# Patient Record
Sex: FEMALE | Race: WHITE | NOT HISPANIC OR LATINO | Employment: FULL TIME | URBAN - METROPOLITAN AREA
[De-identification: names, ages, dates, MRNs, and addresses within clinical notes are randomized per-mention and may not be internally consistent; named-entity substitution may affect disease eponyms.]

---

## 2017-02-14 ENCOUNTER — TRANSFERRED RECORDS (OUTPATIENT)
Dept: HEALTH INFORMATION MANAGEMENT | Facility: CLINIC | Age: 27
End: 2017-02-14

## 2017-02-14 LAB — PAP-ABSTRACT: NORMAL

## 2018-12-14 ENCOUNTER — TELEPHONE (OUTPATIENT)
Dept: OTHER | Facility: CLINIC | Age: 28
End: 2018-12-14

## 2018-12-14 NOTE — TELEPHONE ENCOUNTER
12/14/2018    Call Regarding Onboarding: P1 - other    Attempt 1    Message on voicemail     Comments: no dep      Outreach   SV

## 2018-12-28 NOTE — TELEPHONE ENCOUNTER
12/28/2018    Call Regarding Onboarding P1 Other    Attempt 2    Message on voicemail     Comments:       Outreach   CWR

## 2019-01-09 NOTE — TELEPHONE ENCOUNTER
1/9/2019    Call Regarding Onboarding P1 Other    Attempt 3    Message on voicemail     Comments: 0 DEP      Outreach   CC

## 2019-01-17 ENCOUNTER — TELEPHONE (OUTPATIENT)
Dept: PEDIATRICS | Facility: CLINIC | Age: 29
End: 2019-01-17

## 2019-01-17 NOTE — TELEPHONE ENCOUNTER
"Patient has felt lightheaded since yesterday.  Notes this when she stands up for extended period of time  No shortness of breath, or chest pain.   Has not noted any irregularity in heart beat, no increase in heartbeat that she is aware of.    Yesterday am was at work for one hour and noted she felt lightheaded, toes and fingers felt cold.  Had to sit down quickly due to feeling lightheaded.  Has not passed out.  Was told that she looked pale at the time.  Ate some peanuts and drank some orange juice and felt a little better, but \"still off.\"  Went home and took a nap and then felt better.  Today feels tired, even though she slept all night.  Has menses now.   Doesn't feel that the flow is heavy.    Has noted feeling lightheaded in the past with her menses.    Not currently ill,  Denies sinus congestion or cold symptoms.  No fever.  Today has been resting at home and has not noted feeling lightheaded today.  Appointment has been scheduled for tomorrow  Advised patient if she does experience heart irregularity, or an increase in heart rate with the lightheadedness, she should proceed to the ER for evaluation.  Patient is in agreement.  No further questions.  Will call back if any other questions or concerns.  BRIAN Thakkar RN      "

## 2019-01-18 ENCOUNTER — OFFICE VISIT (OUTPATIENT)
Dept: PEDIATRICS | Facility: CLINIC | Age: 29
End: 2019-01-18
Payer: COMMERCIAL

## 2019-01-18 VITALS
DIASTOLIC BLOOD PRESSURE: 58 MMHG | HEIGHT: 65 IN | OXYGEN SATURATION: 99 % | BODY MASS INDEX: 22.98 KG/M2 | WEIGHT: 137.9 LBS | SYSTOLIC BLOOD PRESSURE: 102 MMHG | TEMPERATURE: 97.6 F

## 2019-01-18 DIAGNOSIS — R42 LIGHTHEADEDNESS: Primary | ICD-10-CM

## 2019-01-18 LAB
BASOPHILS # BLD AUTO: 0 10E9/L (ref 0–0.2)
BASOPHILS NFR BLD AUTO: 0.5 %
DIFFERENTIAL METHOD BLD: NORMAL
EOSINOPHIL # BLD AUTO: 0.1 10E9/L (ref 0–0.7)
EOSINOPHIL NFR BLD AUTO: 1.1 %
ERYTHROCYTE [DISTWIDTH] IN BLOOD BY AUTOMATED COUNT: 11.5 % (ref 10–15)
HCT VFR BLD AUTO: 43.3 % (ref 35–47)
HGB BLD-MCNC: 14.8 G/DL (ref 11.7–15.7)
LYMPHOCYTES # BLD AUTO: 2.1 10E9/L (ref 0.8–5.3)
LYMPHOCYTES NFR BLD AUTO: 26.6 %
MCH RBC QN AUTO: 31 PG (ref 26.5–33)
MCHC RBC AUTO-ENTMCNC: 34.2 G/DL (ref 31.5–36.5)
MCV RBC AUTO: 91 FL (ref 78–100)
MONOCYTES # BLD AUTO: 0.5 10E9/L (ref 0–1.3)
MONOCYTES NFR BLD AUTO: 6.9 %
NEUTROPHILS # BLD AUTO: 5.1 10E9/L (ref 1.6–8.3)
NEUTROPHILS NFR BLD AUTO: 64.9 %
PLATELET # BLD AUTO: 215 10E9/L (ref 150–450)
RBC # BLD AUTO: 4.78 10E12/L (ref 3.8–5.2)
WBC # BLD AUTO: 7.9 10E9/L (ref 4–11)

## 2019-01-18 PROCEDURE — 84443 ASSAY THYROID STIM HORMONE: CPT | Performed by: NURSE PRACTITIONER

## 2019-01-18 PROCEDURE — 36415 COLL VENOUS BLD VENIPUNCTURE: CPT | Performed by: NURSE PRACTITIONER

## 2019-01-18 PROCEDURE — 85025 COMPLETE CBC W/AUTO DIFF WBC: CPT | Performed by: NURSE PRACTITIONER

## 2019-01-18 PROCEDURE — 99214 OFFICE O/P EST MOD 30 MIN: CPT | Performed by: NURSE PRACTITIONER

## 2019-01-18 PROCEDURE — 80048 BASIC METABOLIC PNL TOTAL CA: CPT | Performed by: NURSE PRACTITIONER

## 2019-01-18 ASSESSMENT — MIFFLIN-ST. JEOR: SCORE: 1370.35

## 2019-01-18 NOTE — PROGRESS NOTES
"  SUBJECTIVE:   Jeny Beasley is a 28 year old female who presents to clinic today for the following health issues:    RN note from 11/17  Patient has felt lightheaded since yesterday.  Notes this when she stands up for extended period of time  No shortness of breath, or chest pain.   Has not noted any irregularity in heart beat, no increase in heartbeat that she is aware of.     Yesterday am was at work for one hour and noted she felt lightheaded, toes and fingers felt cold.  Had to sit down quickly due to feeling lightheaded.  Has not passed out.  Was told that she looked pale at the time.  Ate some peanuts and drank some orange juice and felt a little better, but \"still off.\"  Went home and took a nap and then felt better.  Today feels tired, even though she slept all night.  Has menses now.   Doesn't feel that the flow is heavy.    Has noted feeling lightheaded in the past with her menses.     Not currently ill,   Denies sinus congestion or cold symptoms.  No fever.  Today has been resting at home and has not noted feeling lightheaded today.  Appointment has been scheduled for tomorrow  Advised patient if she does experience heart irregularity, or an increase in heart rate with the lightheadedness, she should proceed to the ER for evaluation.  Patient is in agreement.  No further questions.  Will call back if any other questions or concerns.    Dizziness  Fatigue and lightheadedness - has happened before with period, worse this time - is on period now      Duration: since 1/16/19, intermittent    Description   Feeling faint:  YES  Feeling like the surroundings are moving: no   Loss of consciousness or falls: no     Intensity:  Moderate, intermittent    Accompanying signs and symptoms:   Nausea/vomitting: YES- nausea  Palpitations: no   Weakness in arms or legs: no   Vision or speech changes: no   Ringing in ears (Tinnitus): no   Hearing loss related to dizziness: no   Other (fevers/chills/sweating/dyspnea): YES- " "fingers and toes get cold    History (similar episodes/head trauma/previous evaluation/recent bleeding): was told she turned white in the face    Precipitating or alleviating factors (new meds/chemicals): None  Worse with activity/head movement: no     Therapies tried and outcome: None    States had last pap at [facility] 2/14/17 - NIL.      States she typically gets somewhat lightheaded with her menses for a few days. Menses are regular, not particularly heavy. These symptoms are similar but more severe, although this period has been the same a usual. She typically feels dizzy with standing for several minutes, but not immediately with standing. No increase in anxiety. Room not spinning. No ha, vision changes, hearing changes, confusion, palpitations, shortness of breath, etc.     ROS: const/neuro/cv/resp/heent/neuro otherwise negative     OBJECTIVE:  /58 (BP Location: Right arm, Cuff Size: Adult Regular)   Temp 97.6  F (36.4  C) (Tympanic)   Ht 1.657 m (5' 5.25\")   Wt 62.6 kg (137 lb 14.4 oz)   LMP 01/15/2019 (Approximate)   SpO2 99%   BMI 22.77 kg/m    CONSTITUTIONAL: Alert, well-nourished, well-groomed, NAD  RESP: Lungs CTA. No wheeze, rhonchi, rales.  CV: HRRR S1 S2 No MRG. No peripheral edema  NEURO: CN II-XII grossly intact. No nystagmus. Speech and mentation appropriate. Normal gait. Romberg negative.   PSYCH: Bright affect. Appropriate mentation and speech.       ASSESSMENT/PLAN:  (R42) Lightheadedness  (primary encounter diagnosis)  Comment: As above. Non specific lightheadedness with her menses, much worse this month over the past few days. No red flag sx. DDX includes anemia, anxiety, dehydration, vasovagal, orthostasis, etc.   Plan: CBC with platelets differential, Basic         metabolic panel, TSH with free T4 reflex          -Increase fluids  -Check labs  -Discussed supportive cares and reasons to return. Discussed reasons to seek care urgently.   -Will determine plan based on labs. "       Nancy Lovett, CINDY-CHIDI.

## 2019-01-19 LAB
ANION GAP SERPL CALCULATED.3IONS-SCNC: 8 MMOL/L (ref 3–14)
BUN SERPL-MCNC: 18 MG/DL (ref 7–30)
CALCIUM SERPL-MCNC: 9.1 MG/DL (ref 8.5–10.1)
CHLORIDE SERPL-SCNC: 105 MMOL/L (ref 94–109)
CO2 SERPL-SCNC: 26 MMOL/L (ref 20–32)
CREAT SERPL-MCNC: 0.79 MG/DL (ref 0.52–1.04)
GFR SERPL CREATININE-BSD FRML MDRD: >90 ML/MIN/{1.73_M2}
GLUCOSE SERPL-MCNC: 66 MG/DL (ref 70–99)
POTASSIUM SERPL-SCNC: 4.4 MMOL/L (ref 3.4–5.3)
SODIUM SERPL-SCNC: 139 MMOL/L (ref 133–144)
TSH SERPL DL<=0.005 MIU/L-ACNC: 1.22 MU/L (ref 0.4–4)

## 2019-01-21 ENCOUNTER — OFFICE VISIT (OUTPATIENT)
Dept: PEDIATRICS | Facility: CLINIC | Age: 29
End: 2019-01-21
Payer: COMMERCIAL

## 2019-01-21 VITALS
HEART RATE: 85 BPM | OXYGEN SATURATION: 100 % | WEIGHT: 139.4 LBS | SYSTOLIC BLOOD PRESSURE: 122 MMHG | BODY MASS INDEX: 23.22 KG/M2 | HEIGHT: 65 IN | TEMPERATURE: 97.7 F | DIASTOLIC BLOOD PRESSURE: 68 MMHG

## 2019-01-21 DIAGNOSIS — R42 LIGHTHEADEDNESS: Primary | ICD-10-CM

## 2019-01-21 PROCEDURE — 99214 OFFICE O/P EST MOD 30 MIN: CPT | Performed by: NURSE PRACTITIONER

## 2019-01-21 ASSESSMENT — MIFFLIN-ST. JEOR: SCORE: 1377.15

## 2019-01-21 NOTE — PATIENT INSTRUCTIONS
1. Please stay hydrated and well fed. Eat protein and fat not just carbs  2. Please schedule cardiology a month out.   3. Keep me posted with any symptom changes.

## 2019-01-21 NOTE — LETTER
[facility]  [address]  [address]  [place] [address]  [phone]          January 21, 2019    RE:  Jeny Beasley                                                                                                                                                       [address]   [place] [place] [address]            To whom it may concern:    Jeny Beasley is under my professional care. She was seen in clinic today and last week.     She missed part of 1/16 through today. We do not know how long her illness will last.     Sincerely,        Nancy Lovett RN, CNP

## 2019-01-21 NOTE — PROGRESS NOTES
"  SUBJECTIVE:   Jeny Beasley is a 28 year old adult who presents to clinic today for the following health issues:    Patient last seen 1/18/19 for lightheadedness, body weakness.  States symptoms have not improved. States standing for long periods is difficult - had to go home from work. Trouble focusing, states she felt like her body did not want to keep standing.    ROS: const/heent/neuro/cv/resp/psych otherwise negative     OBJECTIVE:  /68 (BP Location: Right arm, Cuff Size: Adult Regular)   Pulse 85   Temp 97.7  F (36.5  C) (Tympanic)   Ht 1.657 m (5' 5.25\")   Wt 63.2 kg (139 lb 6.4 oz)   LMP 01/15/2019 (Approximate)   SpO2 98%   BMI 23.02 kg/m    CONSTITUTIONAL: Alert, well-nourished, well-groomed, NAD  RESP: Lungs CTA. No wheeze, rhonchi, rales.  CV: HRRR S1 S2 No MRG. No peripheral edema  NEURO: CN II-XII grossly intact. No nystagmus. Speech and mentation appropriate. Normal gait. Romberg negative.     ASSESSMENT/PLAN:  (R42) Lightheadedness  (primary encounter diagnosis)  Comment: Again seen for very vague symptoms. She is unable to verbalize how she is feeling exactly. States she is not necessarily dizzy or lightheaded but also uses those words sometimes. Also feels somewhat weak but denies fatigue or muscle weakness. She states \"her body is just telling her to lie down.\" Denies headache, numbness/tingling, muscle weakness, vision changes, palpitations, shortness of breath, ankle swelling, orthopnea, etc. Her labs on previous visit were normal. Exam and vitals normal today. She orthostatic BP was normal but HR was up to 115 with standing for 5 minutes and regular. Possible pots vs viral illness. No red flags for urgent eval.   Plan: CARDIOLOGY EVAL ADULT REFERRAL          -Increase fluids, sleep, eat q 3 hours  -Schedule for cards  -Discussed supportive cares and reasons to return. Discussed reasons to seek care urgently.             Nancy Lovett, FNP-DNP.        "

## 2019-02-05 ENCOUNTER — TELEPHONE (OUTPATIENT)
Dept: PEDIATRICS | Facility: CLINIC | Age: 29
End: 2019-02-05

## 2019-02-05 NOTE — TELEPHONE ENCOUNTER
Panel Management Review      Patient has the following on Jeny Beasley's problem list: None      Composite cancer screening  Chart review shows that this patient is due/due soon for the following Pap Smear  Summary:    Patient is due/failing the following:   PAP and PHYSICAL    Action needed:   Patient needs office visit for pap and physical.    Type of outreach:    Per Care Everywhere patient had pap 2/14/17 at [facility] - NIL result.    Questions for provider review:    None                                                                                                                                  Tamica Corbin CMA    Chart closed .

## 2020-03-11 ENCOUNTER — HEALTH MAINTENANCE LETTER (OUTPATIENT)
Age: 30
End: 2020-03-11

## 2020-07-04 ENCOUNTER — VIRTUAL VISIT (OUTPATIENT)
Dept: FAMILY MEDICINE | Facility: OTHER | Age: 30
End: 2020-07-04

## 2020-07-04 NOTE — PROGRESS NOTES
"Date: 2020 11:35:17  Clinician: Tommy Mireles  Clinician NPI: [id number]  Patient: Jeny Beasley  Patient : 1990  Patient Address: [address] [place] [address] [place] [address]  Patient Phone: [phone]  Visit Protocol: URI  Patient Summary:  Jeny is a 29 year old ( : 1990 ) female who initiated a Visit for cold, sinus infection, or influenza. When asked the question \"Please sign me up to receive news, health information and promotions from [employer].\", Jeny responded \"No\".    Jeny states her symptoms started 1-2 days ago.   Her symptoms consist of a sore throat.   Symptom details   Sore throat: Jeny reports having mild throat pain (1-3 on a 10 point pain scale), does not have exudate on her tonsils, and can swallow liquids. She is not sure if the lymph nodes in her neck are enlarged. A rash has not appeared on the skin since the sore throat started.    Jeny denies having wheezing, nausea, teeth pain, ageusia, diarrhea, vomiting, rhinitis, malaise, ear pain, headache, chills, myalgias, anosmia, facial pain or pressure, fever, cough, and nasal congestion. She also denies having recent facial or sinus surgery in the past 60 days and taking antibiotic medication in the past month. She is not experiencing dyspnea.   Precipitating events  Jeny is not sure if she has been exposed to someone with strep throat.   Pertinent COVID-19 (Coronavirus) information  In the past 14 days, Jeny has not worked in a congregate living setting.   She does not work or volunteer as healthcare worker or a  and does not work or volunteer in a healthcare facility.   Jeny also has not lived in a congregate living setting in the past 14 days. She does not live with a healthcare worker.   Jeny has not had a close contact with a laboratory-confirmed COVID-19 patient within 14 days of symptom onset.   Pertinent medical history  Jeny does not get yeast infections when she takes antibiotics.   Jeny " needs a return to work/school note.   Weight: 130 lbs   Jeny does not smoke or use smokeless tobacco.   She denies pregnancy and denies breastfeeding. She has menstruated in the past month.   Weight: 130 lbs    MEDICATIONS: Sleep Tablet (diphenhydramine) oral, One Daily Women's oral, ALLERGIES: NKDA  Clinician Response:  Dear Jeny,   Your symptoms show that you may have coronavirus (COVID-19). This illness can cause fever, cough and trouble breathing. Many people get a mild case and get better on their own. Some people can get very sick.  What should I do?  We would like to test you for this virus.   1. Please call [phone] to schedule your visit. Explain that you were referred by [facility] to have a COVID-19 test. Be ready to share your [facility] visit ID number.  The following will serve as your written order for this COVID Test, ordered by me, for the indication of suspected COVID [Z20.828]: The test will be ordered in [employer], our electronic health record, after you are scheduled. It will show as ordered and authorized by Fernando Alexis MD.  Order: COVID-19 (Coronavirus) PCR for SYMPTOMATIC testing from [facility].      2. When it's time for your COVID test:  Stay at least 6 feet away from others. (If someone will drive you to your test, stay in the backseat, as far away from the  as you can.)   Cover your mouth and nose with a mask, tissue or washcloth.  Go straight to the testing site. Don't make any stops on the way there or back.      3.Starting now: Stay home and away from others (self-isolate) until:   You've had no fever---and no medicine that reduces fever---for 3 full days (72 hours). And...   Your other symptoms have gotten better. For example, your cough or breathing has improved. And...   At least 10 days have passed since your symptoms started.       During this time, don't leave the house except for testing or medical care.   Stay in your own room, even for meals. Use your own bathroom if you can.    "Stay away from others in your home. No hugging, kissing or shaking hands. No visitors.  Don't go to work, school or anywhere else.    Clean \"high touch\" surfaces often (doorknobs, counters, handles, etc.). Use a household cleaning spray or wipes. You'll find a full list of  on the EPA website: www.epa.gov/pesticide-registration/list-n-disinfectants-use-against-sars-cov-2.   Cover your mouth and nose with a mask, tissue or washcloth to avoid spreading germs.  Wash your hands and face often. Use soap and water.  Caregivers in these groups are at risk for severe illness due to COVID-19:  o People 65 years and older  o People who live in a nursing home or long-term care facility  o People with chronic disease (lung, heart, cancer, diabetes, kidney, liver, immunologic)  o People who have a weakened immune system, including those who:   Are in cancer treatment  Take medicine that weakens the immune system, such as corticosteroids  Had a bone marrow or organ transplant  Have an immune deficiency  Have poorly controlled HIV or AIDS  Are obese (body mass index of 40 or higher)  Smoke regularly   o Caregivers should wear gloves while washing dishes, handling laundry and cleaning bedrooms and bathrooms.  o Use caution when washing and drying laundry: Don't shake dirty laundry, and use the warmest water setting that you can.  o For more tips, go to www.cdc.gov/coronavirus/2019-ncov/downloads/10Things.pdf.    4.Sign up for [employer]. We know it's scary to hear that you might have COVID-19. We want to track your symptoms to make sure you're okay over the next 2 weeks. Please look for an email from [employer]---this is a free, online program that we'll use to keep in touch. To sign up, follow the link in the email. Learn more at [email or web address]  How can I take care of myself?   Get lots of rest. Drink extra fluids (unless a doctor has told you not to).   Take Tylenol (acetaminophen) for fever or " pain. If you have liver or kidney problems, ask your family doctor if it's okay to take Tylenol.   Adults can take either:    650 mg (two 325 mg pills) every 4 to 6 hours, or...   1,000 mg (two 500 mg pills) every 8 hours as needed.    Note: Don't take more than 3,000 mg in one day. Acetaminophen is found in many medicines (both prescribed and over-the-counter medicines). Read all labels to be sure you don't take too much.   For children, check the Tylenol bottle for the right dose. The dose is based on the child's age or weight.    If you have other health problems (like cancer, heart failure, an organ transplant or severe kidney disease): Call your specialty clinic if you don't feel better in the next 2 days.       Know when to call 911. Emergency warning signs include:    Trouble breathing or shortness of breath Pain or pressure in the chest that doesn't go away Feeling confused like you haven't felt before, or not being able to wake up Bluish-colored lips or face.  Where can I get more information?   [facility] -- About COVID-19: [email or web address]   CDC -- What to Do If You're Sick: www.cdc.gov/coronavirus/2019-ncov/about/steps-when-sick.html   [facility] -- Ending Home Isolation: www.cdc.gov/coronavirus/2019-ncov/hcp/disposition-in-home-patients.html   [facility] -- Caring for Someone: www.cdc.gov/coronavirus/2019-ncov/if-you-are-sick/care-for-someone.html   [facility] -- Interim Guidance for Hospital Discharge to Home: www.health.[facility].[place].us/diseases/coronavirus/hcp/hospdischarge.pdf   [facility] clinical trials (COVID-19 research studies): clinicalaffairs.[facility]/umn-clinical-trials    Below are the COVID-19 hotlines at the [place] Department of Health ([facility]). Interpreters are available.    For health questions: Call 624-847-2369 or 1-116.942.2869 (7 a.m. to 7 p.m.) For questions about schools and childcare: Call 546-083-0082 or 1-583.321.3885 (7 a.m. to 7 p.m.)    Diagnosis: Pain in  throat  Diagnosis ICD: R07.0  Addendum created: July 04 15:31:50, 2020 created by: Tommy Mireles body: We would like to test you for this virus.    1. Please call [phone] to schedule your visit. Explain that you were referred by [facility] to have a COVID-19 test. Be ready to share your [facility] visit ID number.

## 2020-07-09 ENCOUNTER — TELEPHONE (OUTPATIENT)
Dept: PEDIATRICS | Facility: CLINIC | Age: 30
End: 2020-07-09

## 2020-07-09 NOTE — TELEPHONE ENCOUNTER
Patient Returning Call  Reason for call:  Call back  Information relayed to patient:  Nurse will reach out to discuss  Patient has additional questions:  Yes  What are your questions/concerns:  Pt is having low blood sugar symptoms, and is inquiring about whether to be seen or how to address. Not currently established with Sana, but has seen Nancy Lovett prev. Please call pt to discuss   Okay to leave a detailed message?: Yes at Home number on file [phone] (home)

## 2020-07-09 NOTE — TELEPHONE ENCOUNTER
Called and spoke with patient . She states that an hour ago patient was cooking and felt lightheaded, sweaty and looked pale. She laid down and ate a yogurt over the course of half an hour. She felt slightly improved but feels worse when she sits up.     Patient has strong family history of diabetes.     Today patient ate chocolate pudding, handful of nuts, toney seed pudding, 1 mandarin orange, popsicle , and 1 greek yogurt.     On 1/18/20 patient had low blood sugar at that time Nancy Lovett recommended:    Uri Fermin,   All of your labs are normal other than your blood sugar, which was low. This could cause dizziness. I'm not sure if you said you had recently eaten prior to your episodes or not.   I recommend eating a decent sized meal (at least 400 calories of protein, fat and carbohydrates) three times a day with 2 snacks in between (200-300 calories).   If you feel you have been already doing this and are still feeling lightheaded please let me know.   Best,   Nancy Loevtt, FNP-DNP.       Requested that patient eat a full meal consisting of protein carbohydrates and fats. If she does not feel improved after eating, requested that someone drive her to urgent care to be evaluated. Patient agree's to plan.     Requested that patient schedule a physical with Nancy Lovett first available. Nora Hendricks RN on 7/9/2020 at 4:30 PM

## 2020-07-10 ENCOUNTER — OFFICE VISIT (OUTPATIENT)
Dept: URGENT CARE | Facility: URGENT CARE | Age: 30
End: 2020-07-10
Payer: COMMERCIAL

## 2020-07-10 VITALS
DIASTOLIC BLOOD PRESSURE: 62 MMHG | HEART RATE: 78 BPM | OXYGEN SATURATION: 97 % | SYSTOLIC BLOOD PRESSURE: 94 MMHG | BODY MASS INDEX: 22.48 KG/M2 | WEIGHT: 136.1 LBS | TEMPERATURE: 98.7 F

## 2020-07-10 DIAGNOSIS — R07.0 THROAT PAIN: Primary | ICD-10-CM

## 2020-07-10 LAB
DEPRECATED S PYO AG THROAT QL EIA: NEGATIVE
HETEROPH AB SER QL: NEGATIVE
SPECIMEN SOURCE: NORMAL

## 2020-07-10 PROCEDURE — 36415 COLL VENOUS BLD VENIPUNCTURE: CPT | Performed by: FAMILY MEDICINE

## 2020-07-10 PROCEDURE — 87651 STREP A DNA AMP PROBE: CPT | Performed by: FAMILY MEDICINE

## 2020-07-10 PROCEDURE — 86308 HETEROPHILE ANTIBODY SCREEN: CPT | Performed by: FAMILY MEDICINE

## 2020-07-10 PROCEDURE — 40001204 ZZHCL STATISTIC STREP A RAPID: Performed by: FAMILY MEDICINE

## 2020-07-10 PROCEDURE — 99213 OFFICE O/P EST LOW 20 MIN: CPT | Performed by: FAMILY MEDICINE

## 2020-07-10 RX ORDER — DIPHENHYDRAMINE HCL 25 MG
25 TABLET ORAL
COMMUNITY
End: 2023-06-28

## 2020-07-10 RX ORDER — IBUPROFEN 200 MG
200 TABLET ORAL EVERY 4 HOURS PRN
COMMUNITY

## 2020-07-10 NOTE — LETTER
[facility]  [address]  [address]  [place] [address]  [phone]      July 10, 2020    RE:  Jeny Beasley                                                                                                                                                       [address]   [place] [facility] [place] [address]            To whom it may concern:    Jeny Beasley is under my professional care at the [facility] on July 10, 2020.  Because of her current illness, please her absences from work on July 9 and July 10, 2020. Jeny Beasley  may return to work once she starts to feel better.         Sincerely,        Nikita Alberto MD    [facility]

## 2020-07-10 NOTE — PATIENT INSTRUCTIONS
Warm saltwater gargles    Ice-cold liquids    Tylenol, ibuprofen for the sore throat.      follow up with a primary care provider if not better in 3-5 days.

## 2020-07-10 NOTE — PROGRESS NOTES
SUBJECTIVE:  Jeny Beasley is a 30 year old adult with a chief complaint of sore throat.  Onset of symptoms was July 2, 2020.  Course of illness: worsening since this morning. .  Severity moderate pain.  The pain pain is worse with swallowing.  She did have a temp of 99 F three days ago.  The temperature two days was 97 F.    Current and Associated symptoms: as listed above.   Treatment measures tried include Saltwater gargles.  .  Predisposing factors include none.  No sick contacts with sore throat.  .    Patient's COVID-19 test from July 7, 2020, was negative.      Past Medical History:   Diagnosis Date     Anxiety      Current Outpatient Medications   Medication Sig Dispense Refill     diphenhydrAMINE (BENADRYL) 25 MG tablet Take 25 mg by mouth nightly as needed for itching or allergies       ibuprofen (ADVIL/MOTRIN) 200 MG tablet Take 200 mg by mouth every 4 hours as needed for mild pain       Multiple Vitamins-Minerals (EMERGEN-C IMMUNE) PACK        Nutritional Supplements (VITAMIN D BOOSTER PO)        MULTIPLE VITAMIN PO        vitamin B complex with vitamin C (VITAMIN  B COMPLEX) tablet Take 1 tablet by mouth daily Taking 1/2 pill every other day       Social History     Tobacco Use     Smoking status: Never Smoker     Smokeless tobacco: Never Used   Substance Use Topics     Alcohol use: Yes     Comment: 2-3 times per month, 2 per time       ROS:  CONSTITUTIONAL:NEGATIVE for fever  INTEGUMENTARY/SKIN: NEGATIVE for worrisome rashes  ENT/MOUTH: POSITIVE for persistent sore throat.     OBJECTIVE:   BP 94/62   Pulse 78   Temp 98.7  F (37.1  C) (Tympanic)   Wt 61.7 kg (136 lb 1.6 oz)   LMP 07/08/2020   SpO2 97%   BMI 22.48 kg/m    GENERAL APPEARANCE: healthy, alert and no distress  HENT: ear canals and TM's normal. Pharynx mildly erythematous with no exudate noted.  NECK: supple, non-tender to palpation, no adenopathy noted  RESP: lungs clear to auscultation - no rales, rhonchi or wheezes  CV: regular rates  and rhythm, normal S1 S2, no murmur noted  SKIN: no suspicious lesions or rashes    LABS:    Results for orders placed or performed in visit on 07/10/20   Mononucleosis screen     Status: None   Result Value Ref Range    Mononucleosis Screen Negative NEG^Negative   Streptococcus A Rapid Scr w Reflx to PCR     Status: None    Specimen: Throat   Result Value Ref Range    Strep Specimen Description Throat     Streptococcus Group A Rapid Screen Negative NEG^Negative         ASSESSMENT:   Throat pain (mild)    PLAN:   Symptomatic treat with gargles, lozenges, and OTC analgesic as needed.   follow up with a primary care provider if not better in 3-5 days.   Pending lab:  The Strep PCR Test.  Patient will be notified if this is positive.        Nikita Alberto MD

## 2020-07-11 LAB
SPECIMEN SOURCE: NORMAL
STREP GROUP A PCR: NOT DETECTED

## 2020-07-14 ENCOUNTER — OFFICE VISIT (OUTPATIENT)
Dept: URGENT CARE | Facility: URGENT CARE | Age: 30
End: 2020-07-14
Payer: COMMERCIAL

## 2020-07-14 VITALS
TEMPERATURE: 98.1 F | WEIGHT: 136.1 LBS | DIASTOLIC BLOOD PRESSURE: 70 MMHG | BODY MASS INDEX: 22.48 KG/M2 | HEART RATE: 76 BPM | OXYGEN SATURATION: 100 % | SYSTOLIC BLOOD PRESSURE: 102 MMHG

## 2020-07-14 DIAGNOSIS — R09.82 POSTNASAL DRIP: Primary | ICD-10-CM

## 2020-07-14 DIAGNOSIS — Z83.49 FAMILY HISTORY OF HYPERTHYROIDISM: ICD-10-CM

## 2020-07-14 PROCEDURE — 36415 COLL VENOUS BLD VENIPUNCTURE: CPT | Performed by: FAMILY MEDICINE

## 2020-07-14 PROCEDURE — 84443 ASSAY THYROID STIM HORMONE: CPT | Performed by: FAMILY MEDICINE

## 2020-07-14 PROCEDURE — 99213 OFFICE O/P EST LOW 20 MIN: CPT | Performed by: FAMILY MEDICINE

## 2020-07-14 NOTE — PATIENT INSTRUCTIONS
Flonase, Nasonex, Nasacort, Rhinocort:  Pick any of these and follow the package instructions.    If not any better in 2 weeks, please try to get an appointment with a primary care doctor.  If unable to get in with primary care, follow up in urgent care.  If still not better, you might need referral to ENT.

## 2020-07-14 NOTE — LETTER
[facility]  [address]  [address]  [place] [address]  [phone]      July 14, 2020    RE:  Jeny Beasley                                                                                                                                                       To whom it may concern:    Jeny Beasley was seen in urgent care today for a non-contagious condition.  Please excuse her absence from work due to today's clinic visit and this condition.  She is cleared to return to work at this time.    Sincerely,        Trina Santacruz MD    [facility]

## 2020-07-14 NOTE — PROGRESS NOTES
Jeny Beasley is a 30 year old adult who returns to  today for continued sore throat.  Not worst when waking up.  No fever or chills.  Feels well overall except sore throat.  Denies sneezing or runny nose.  No coughing.  No heartburn.  Negative COVID test.    Reviewed last visit's note and lab results.  Negative mono.  Strep confirmation PCR was negative.    Pt reports family history of hyperthryoidism in mom and sister.  Would like to have thyroid testing done to make sure this isn't contributing to sore throat.  Hasn't noticed any nodules in her neck herself.    /70   Pulse 76   Temp 98.1  F (36.7  C) (Tympanic)   Wt 61.7 kg (136 lb 1.6 oz)   LMP 07/08/2020   SpO2 100%   BMI 22.48 kg/m    GEN: well-appearing, in NAD  ENT: TMs and canals normal bilaterally.  Oral MMM.  No tonsillar enlargement or exudates.  Uvula midline.  There is some cobblestoning of the posterior pharynx.  Neck:  Supple, no LAD.  Thyroid does not feel enlarged or nodular to me.    ASSESSMENT:    ICD-10-CM    1. Postnasal drip  R09.82    2. Family history of hyperthyroidism  Z83.49 TSH with free T4 reflex     PLAN:  Patient Instructions   Flonase, Nasonex, Nasacort, Rhinocort:  Pick any of these and follow the package instructions.    If not any better in 2 weeks, please try to get an appointment with a primary care doctor.  If unable to get in with primary care, follow up in urgent care.  If still not better, you might need referral to ENT.      Would consider trial of H2 blockers and PPIs if nasal steroid sprays do not seem to be helping with sore throat symptoms to see if there's some silent reflux component.    TSH with reflex T4 drawn today.   staff will contact patient if this result comes back abnormal.

## 2020-07-15 LAB — TSH SERPL DL<=0.005 MIU/L-ACNC: 1.06 MU/L (ref 0.4–4)

## 2020-07-28 NOTE — PATIENT INSTRUCTIONS
"I don't see any signs of anything too serious causing your symptoms.     I'll do some labs and get back to you Monday.

## 2020-09-25 ENCOUNTER — OFFICE VISIT (OUTPATIENT)
Dept: URGENT CARE | Facility: URGENT CARE | Age: 30
End: 2020-09-25
Payer: COMMERCIAL

## 2020-09-25 VITALS
WEIGHT: 145 LBS | TEMPERATURE: 98.1 F | SYSTOLIC BLOOD PRESSURE: 121 MMHG | BODY MASS INDEX: 23.94 KG/M2 | OXYGEN SATURATION: 98 % | DIASTOLIC BLOOD PRESSURE: 85 MMHG | HEART RATE: 102 BPM

## 2020-09-25 DIAGNOSIS — N94.6 DYSMENORRHEA: ICD-10-CM

## 2020-09-25 DIAGNOSIS — J02.9 VIRAL PHARYNGITIS: ICD-10-CM

## 2020-09-25 DIAGNOSIS — R53.83 FATIGUE, UNSPECIFIED TYPE: Primary | ICD-10-CM

## 2020-09-25 LAB
BASOPHILS # BLD AUTO: 0.1 10E9/L (ref 0–0.2)
BASOPHILS NFR BLD AUTO: 0.4 %
DIFFERENTIAL METHOD BLD: ABNORMAL
EOSINOPHIL # BLD AUTO: 0.3 10E9/L (ref 0–0.7)
EOSINOPHIL NFR BLD AUTO: 2.5 %
ERYTHROCYTE [DISTWIDTH] IN BLOOD BY AUTOMATED COUNT: 11.3 % (ref 10–15)
HCT VFR BLD AUTO: 41 % (ref 35–47)
HGB BLD-MCNC: 14.6 G/DL (ref 11.7–15.7)
LYMPHOCYTES # BLD AUTO: 2.5 10E9/L (ref 0.8–5.3)
LYMPHOCYTES NFR BLD AUTO: 21.5 %
MCH RBC QN AUTO: 31.6 PG (ref 26.5–33)
MCHC RBC AUTO-ENTMCNC: 35.6 G/DL (ref 31.5–36.5)
MCV RBC AUTO: 89 FL (ref 78–100)
MONOCYTES # BLD AUTO: 0.6 10E9/L (ref 0–1.3)
MONOCYTES NFR BLD AUTO: 4.8 %
NEUTROPHILS # BLD AUTO: 8.2 10E9/L (ref 1.6–8.3)
NEUTROPHILS NFR BLD AUTO: 70.8 %
PLATELET # BLD AUTO: 261 10E9/L (ref 150–450)
RBC # BLD AUTO: 4.62 10E12/L (ref 3.8–5.2)
WBC # BLD AUTO: 11.5 10E9/L (ref 4–11)

## 2020-09-25 PROCEDURE — 85025 COMPLETE CBC W/AUTO DIFF WBC: CPT | Performed by: NURSE PRACTITIONER

## 2020-09-25 PROCEDURE — 36415 COLL VENOUS BLD VENIPUNCTURE: CPT | Performed by: NURSE PRACTITIONER

## 2020-09-25 PROCEDURE — 99213 OFFICE O/P EST LOW 20 MIN: CPT | Performed by: NURSE PRACTITIONER

## 2020-09-25 RX ORDER — FLUTICASONE PROPIONATE 50 MCG
1 SPRAY, SUSPENSION (ML) NASAL DAILY
COMMUNITY
End: 2022-05-27

## 2020-09-25 ASSESSMENT — ENCOUNTER SYMPTOMS
ABDOMINAL DISTENTION: 0
CHILLS: 0
FEVER: 0
NAUSEA: 0
SLEEP DISTURBANCE: 1
COUGH: 0
VOICE CHANGE: 0
SORE THROAT: 1
TROUBLE SWALLOWING: 0
DIAPHORESIS: 0
MYALGIAS: 0
FATIGUE: 1
VOMITING: 0
ADENOPATHY: 0
HEADACHES: 0
ABDOMINAL PAIN: 0

## 2020-09-25 NOTE — PROGRESS NOTES
"Chief Complaint   Patient presents with     Urgent Care     Pharyngitis     st x 1 week , , severe menstrual cramps, insomnia- states that she feels \"off\"     SUBJECTIVE:  Jeny Beasley is a 30 year old adult presenting with one week of mild sore throat, fatigue, trouble sleeping and period cramps. She had a similar sore throat in July and tested negative for covid and strep at that time. She has been taking flonase, benadryl for possible allergies. She denies GERD. This does not feel like strep to her, usually it is more severe. She has occasionally had more painful periods, but denies known gyn issues such as PCOS, endometriosis, polyps. No hormonal contraception. Denies known covid contact.    Past Medical History:   Diagnosis Date     Anxiety      diphenhydrAMINE (BENADRYL) 25 MG tablet, Take 25 mg by mouth nightly as needed for itching or allergies  fluticasone (FLONASE) 50 MCG/ACT nasal spray, Spray 1 spray into both nostrils daily  Multiple Vitamins-Minerals (EMERGEN-C IMMUNE) PACK,   ibuprofen (ADVIL/MOTRIN) 200 MG tablet, Take 200 mg by mouth every 4 hours as needed for mild pain  MULTIPLE VITAMIN PO,     No current facility-administered medications on file prior to visit.     Social History     Tobacco Use     Smoking status: Never Smoker     Smokeless tobacco: Never Used   Substance Use Topics     Alcohol use: Yes     Comment: 2-3 times per month, 2 per time     No Known Allergies    Review of Systems   Constitutional: Positive for fatigue. Negative for chills, diaphoresis and fever.   HENT: Positive for sore throat. Negative for congestion, ear pain, trouble swallowing and voice change.    Respiratory: Negative for cough.    Gastrointestinal: Negative for abdominal distention, abdominal pain, nausea and vomiting.   Genitourinary:        Moderate menstrual cramps.   Musculoskeletal: Negative for myalgias.   Skin: Negative for rash.   Neurological: Negative for headaches.   Hematological: Negative for " adenopathy.   Psychiatric/Behavioral: Positive for sleep disturbance.     OBJECTIVE:   /85   Pulse 102   Temp 98.1  F (36.7  C) (Temporal)   Wt 65.8 kg (145 lb)   SpO2 98%   BMI 23.94 kg/m       Physical Exam  Constitutional:       General: Jeny Beasley is not in acute distress.     Appearance: Jeny Beasley is well-developed. Jeny Beasley is not ill-appearing, toxic-appearing or diaphoretic.   HENT:      Head: Normocephalic and atraumatic.      Mouth/Throat:      Mouth: Mucous membranes are moist.      Pharynx: Posterior oropharyngeal erythema (nonedematous tonsils) present. No oropharyngeal exudate.   Eyes:      Conjunctiva/sclera: Conjunctivae normal.      Pupils: Pupils are equal, round, and reactive to light.   Neck:      Musculoskeletal: Normal range of motion and neck supple.   Cardiovascular:      Rate and Rhythm: Normal rate.   Pulmonary:      Effort: Pulmonary effort is normal. No respiratory distress.   Musculoskeletal: Normal range of motion.   Lymphadenopathy:      Cervical: No cervical adenopathy.   Skin:     General: Skin is warm.      Capillary Refill: Capillary refill takes less than 2 seconds.      Findings: No erythema or rash.   Neurological:      General: No focal deficit present.      Mental Status: Jeny Beasley is alert and oriented to person, place, and time.   Psychiatric:         Mood and Affect: Mood normal.         Behavior: Behavior normal.       ASSESSMENT:    ICD-10-CM    1. Fatigue, unspecified type  R53.83 CBC with platelets and differential   2. Dysmenorrhea  N94.6 CBC with platelets and differential   3. Viral pharyngitis  J02.9      PLAN:   Patient Instructions   Likely a viral pharyngitis and menstrual related fatigue  Home isolation for 10 days from symptom onset and at least 24 hours fever free  CBC drawn per request, we call for anything abnormal  Hold on strep and covid test  Drink plenty of fluids and rest.  May use salt water gargles- about 8 oz warm water with  about 1 teaspoon salt  Choraspetic and Cepacol spray are over the counter medications that numb the throat.  Vit C, zinc  Over the counter pain relievers such as tylenol or ibuprofen may be used as needed.  Honey has been shown to be helpful in cough management and is soothing to a sore throat. May add to lemon tea.  Please follow up with primary care provider if not improving, worsening or new symptoms.  OBGYN if period pain lingers or worsens    Follow up with primary care provider with any problems, questions or concerns or if symptoms worsen or fail to improve. Patient agreed to plan and verbalized understanding.    Laxmi Driscoll, [facility] [place]

## 2020-09-25 NOTE — PATIENT INSTRUCTIONS
Likely a viral pharyngitis and menstrual related fatigue  Home isolation for 10 days from symptom onset and at least 24 hours fever free  CBC drawn per request, we call for anything abnormal  Hold on strep and covid test  Drink plenty of fluids and rest.  May use salt water gargles- about 8 oz warm water with about 1 teaspoon salt  Choraspetic and Cepacol spray are over the counter medications that numb the throat.  Vit C, zinc  Over the counter pain relievers such as tylenol or ibuprofen may be used as needed.  Honey has been shown to be helpful in cough management and is soothing to a sore throat. May add to lemon tea.  Please follow up with primary care provider if not improving, worsening or new symptoms.  OBGYN if period pain lingers or worsens

## 2020-09-25 NOTE — LETTER
[facility]  [address]  [address]  [place] [address]        2020    Jeny Beasley  [address]   [facility] [id number] [phone] (home)     :     1990      To Whom it May Concern:    This patient was seen in clinic today. Please excuse medical related absences. May return to work Tuesday the  if fever free for 24 hours.    Please contact me for questions or concerns.    Sincerely,    Laxmi Driscoll, NP

## 2020-11-18 ENCOUNTER — OFFICE VISIT (OUTPATIENT)
Dept: OBGYN | Facility: CLINIC | Age: 30
End: 2020-11-18
Payer: COMMERCIAL

## 2020-11-18 VITALS — BODY MASS INDEX: 24.61 KG/M2 | WEIGHT: 149 LBS | DIASTOLIC BLOOD PRESSURE: 70 MMHG | SYSTOLIC BLOOD PRESSURE: 116 MMHG

## 2020-11-18 DIAGNOSIS — Z30.011 ENCOUNTER FOR BCP (BIRTH CONTROL PILLS) INITIAL PRESCRIPTION: ICD-10-CM

## 2020-11-18 DIAGNOSIS — N94.6 PAINFUL MENSTRUAL PERIODS: Primary | ICD-10-CM

## 2020-11-18 PROCEDURE — 99202 OFFICE O/P NEW SF 15 MIN: CPT | Performed by: ADVANCED PRACTICE MIDWIFE

## 2020-11-18 RX ORDER — LEVONORGESTREL/ETHIN.ESTRADIOL 0.1-0.02MG
1 TABLET ORAL DAILY
Qty: 28 TABLET | Refills: 3 | Status: SHIPPED | OUTPATIENT
Start: 2020-11-18 | End: 2021-04-09

## 2020-11-18 RX ORDER — LEVONORGESTREL/ETHIN.ESTRADIOL 0.1-0.02MG
1 TABLET ORAL DAILY
Qty: 28 TABLET | Refills: 3 | Status: SHIPPED | OUTPATIENT
Start: 2020-11-18 | End: 2020-11-18

## 2020-11-18 NOTE — PROGRESS NOTES
SUBJECTIVE:                                                   Jeny Beasley is a 30 year old who presents to clinic today for the following health issue(s):  Patient presents with:  Consult: c/o craming and fatigue with periods for years    HPI:  Pt reports that for the past 2 years or so she has experienced increasingly painful cramping around the time of her periods. Also feeling increasing fatigue during the week of her period. Interested in discussing options to help with this. Currently using Midol around the time of periods with minimal relief. States cramping is the worst in the first 2 days. Periods are regular, flow normal, lasting about 5-6 days.     Patient's last menstrual period was 10/17/2020.  Menstrual History: frequency: every 28 days  Patient is sexually active  .  Using vasectomy for contraception.     Last PHQ-9 score on record = No flowsheet data found.  Last GAD7 score on record = No flowsheet data found.    Problem list and histories reviewed & adjusted, as indicated.  Additional history: as documented.    There is no problem list on file for this patient.    Past Surgical History:   Procedure Laterality Date     FOREARM SURGERY      right at age 7 for staph infection     wisdom teeth        Social History     Tobacco Use     Smoking status: Never Smoker     Smokeless tobacco: Never Used   Substance Use Topics     Alcohol use: Yes     Comment: 2-3 times per month, 2 per time      Problem (# of Occurrences) Relation (Name,Age of Onset)    Diabetes (2) Maternal Grandmother, Paternal Grandmother    Hypertension (3) Father, Maternal Grandmother, Paternal Grandmother    Thyroid Disease (2) Mother: hyperthyroid, Sister: hyperthyroid       Negative family history of: Breast Cancer            Current Outpatient Medications   Medication Sig     Acetaminophen (MIDOL PO)      ibuprofen (ADVIL/MOTRIN) 200 MG tablet Take 200 mg by mouth every 4 hours as needed for mild pain      levonorgestrel-ethinyl estradiol (AVIANE) 0.1-20 MG-MCG tablet Take 1 tablet by mouth daily     MULTIPLE VITAMIN PO      Multiple Vitamins-Minerals (EMERGEN-C IMMUNE) PACK      diphenhydrAMINE (BENADRYL) 25 MG tablet Take 25 mg by mouth nightly as needed for itching or allergies     fluticasone (FLONASE) 50 MCG/ACT nasal spray Spray 1 spray into both nostrils daily     No current facility-administered medications for this visit.      No Known Allergies    ROS:  CONSTITUTIONAL: NEGATIVE for fever, chills, change in weight  RESP: NEGATIVE for significant cough or SOB  CV: NEGATIVE for chest pain, palpitations or peripheral edema  : NEGATIVE for unusual urinary or vaginal symptoms. Periods are regular.  NEURO: NEGATIVE for weakness, dizziness or paresthesias  PSYCHIATRIC: NEGATIVE for changes in mood or affect    OBJECTIVE:     /70 (BP Location: Left arm, Cuff Size: Adult Regular)   Wt 67.6 kg (149 lb)   LMP 10/17/2020   BMI 24.61 kg/m    Body mass index is 24.61 kg/m .    PHYSICAL EXAM:  Gen: A/O x3, NAD  Resp: non-labored  Psych: Affect appears normal and bright     In-Clinic Test Results:  No results found for this or any previous visit (from the past 24 hour(s)).    ASSESSMENT/PLAN:                                                        ICD-10-CM    1. Painful menstrual periods  N94.6    2. Encounter for BCP (birth control pills) initial prescription  Z30.011 levonorgestrel-ethinyl estradiol (AVIANE) 0.1-20 MG-MCG tablet     DISCONTINUED: levonorgestrel-ethinyl estradiol (AVIANE) 0.1-20 MG-MCG tablet       PLAN:  Discussed initial treatment options for dysmenorrhea if conservative measures have failed (OTC pain medication, heat). Reviewed rationale for use of hormonal contraception which Jeny is open to. No contraindications to estrogen containing birth control.  Aubra rx'd to pharmacy, proper usage reviewed. R/B/SE discussed, ACHES reviewed (see below).   Plan for f/u in 3 months or sooner with  concerns.    The use of the oral contraceptive pill has been fully discussed with the patient. This includes the proper method to initiate  and continue the pill, the need for regular compliance to ensure adequate contraceptive effect, the physiology which makes the pill effective, the instructions for what to do in event of a missed pill, and warnings about anticipated minor side effects such as breakthrough spotting, nausea, breast tenderness, weight changes, acne, headaches, etc. She was informed of the irregular bleeding pattern that can occur when the pill is first started or a new form is changed over for the first 2-3 months.  She has been told of the more serious potential side effects such as MI, stroke, and deep vein thrombosis, all of which are very unlikely.  She has been asked to report any signs of such serious problems immediately.   She understands and wishes to take the medication as prescribed.    STORMY Akins CNM 11/18/2020 2:44 PM

## 2020-11-18 NOTE — PROGRESS NOTES
Pt was seen today, Rx for birth control was sent in, but transmission failed per Epic report.  Re-sending.    Marietta Walker RN

## 2020-11-18 NOTE — NURSING NOTE
"Chief Complaint   Patient presents with     Consult     c/o craming and fatigue with periods for years       Initial /70 (BP Location: Left arm, Cuff Size: Adult Regular)   Wt 67.6 kg (149 lb)   LMP 10/17/2020   BMI 24.61 kg/m   Estimated body mass index is 24.61 kg/m  as calculated from the following:    Height as of 19: 1.657 m (5' 5.25\").    Weight as of this encounter: 67.6 kg (149 lb).  BP completed using cuff size: regular    Questioned patient about current smoking habits.  Pt. has never smoked.          Irina Anand CMA    "

## 2020-11-18 NOTE — LETTER
November 18, 2020      Jeny Ramos  [address]   [place] [address]      To Whom It May Concern:    Jeny is a patient under my medical care. She was seen for a visit on 11/18/2020. Please excuse her from work during that time.       Sincerely,        Lucinda Akins CNM

## 2020-11-18 NOTE — PATIENT INSTRUCTIONS
Birth Control Pills    Combination birth control pills contain both estrogen and progestin.  There are numerous brands of birth control pills otherwise known as oral contraceptive pills (OCP's).  Each brand has a different combination of estrogen and progestin so every woman can find the one that is right for her.  OCP's are a safe and effective way to prevent pregnancy in most women.    How do OCP's work  OCP's work by several different mechanisms.  They cause changes in the cervix and the lining of the uterus.  The cervical mucus becomes thicker which will prevent the sperm from entering the cervix.  The lining of the uterus becomes thin which helps prevent an egg from attaching to it.  In combination, these events make it unlikely that you will get pregnant. It may also prevent ovulation completely.    Benefits of OCP's  May reduce your risk of:  Cancer of the uterus and ovary, ovarian cysts, pelvic infection, bone loss, benign breast disease, anemia, ectopic pregnancy and acne.  It may also decrease symptoms of PCOS (Polycystic Ovarian Syndrome). OCP's may also improve cramping during menstrual cycle and may make you cycle shorter and lighter.    How to take OCP's  You have several choices on how to start taking your OCP's:    You can start the pill on the first day of your next period    You can start the pill on the Sunday after your next period starts    You can start the pill on the first day it was prescribed no matter where you are in your cycle.  In this case, you will need to make sure you are not pregnant.    No matter when you start your first pack, you will always start your next pack on the same day you started your first pack.    You should take the pill at the same time every day.  Do not skip any pills.  If you miss any pills, are taking antibiotics or vomit, use a backup method of birth control until you get your next period.    Pills come in packs of 21, 28 or 91 pills:      21 Pills:  Take one  pill at the same time every day for 21 days.  Wait 7 days before beginning your next pack.  During these 7 days you will have your period.    28 Pills:  Take one pill at the same time every day for 28 days.  The last 7 pills in the pack do not contain estrogen/progestin.  During these 7 days you will have your period.      91 Pills:  Take one pill at the same time every day for 91 days.  The last 7 pills in the pack do not contain estrogen/progestin.  During these 7 days you will have your period.  With this method you will only have 4 periods a year.  Some women eventually have no bleeding at all.    Each pill pack comes with instructions.  Please make sure you read them and understand these instructions.      What to do if you miss a pill    Occasionally you may forget to take a pill or not take it on time.  Take the missed pill as soon as you remember.  Take the next pill at the regular time.  It is ok if you take two pills in one day.  You may feel a bit queasy or have some spotting, this is normal and should not be concerning.  If you have missed more than one pill use a back up method of birth control and call the clinic for instructions on how to proceed.    Who should not take Combined OCP's    If you have a history or have blood clots    A history of cerebral vascular accident (stroke)    If you have ischemic heart or coronary artery disease    Known of suspected breast cancer    Known or suspected pregnancy    Smoker and over age 35    Any know liver abnormality    Migraine headaches with an aura    Undiagnosed abnormal vaginal bleeding    High blood pressure    Common side effects when starting OCP's  Headache, nausea, dizziness, breakthrough bleeding, missed periods, tender breasts, depression and anxiety.  Most side effects are minor and resolve in the first few months. Take the pill with meals or at bedtime if nausea occurs.    Call or return for care in the following circumstances:      Unexpected  missed periods or very heavy bleeding    Persistent vaginal bleeding    Depression    Suspected pregnancy    Persistent side effects such as:  Nausea, irregular menses or mood changes.    Seek emergency care immediately for the following:  ACHES    Abdominal or pelvic pain    Chest pain    Severe headache     Visual disturbances    Severe leg pain or numbness or tingling of extremities    Lastly-    Use of a backup method is recommended for the first cycle    Condoms are recommended to protect against STI's    OCP's are 99% effective if take correctly.    The pill helps to keep your periods regular, lighter and shorter and reduces cramps.  If you desire a pregnancy, you may stop taking your OCPs.     Please call the clinic with questions and concerns  [facility]  [phone]

## 2021-01-03 ENCOUNTER — HEALTH MAINTENANCE LETTER (OUTPATIENT)
Age: 31
End: 2021-01-03

## 2021-04-06 DIAGNOSIS — Z30.011 ENCOUNTER FOR BCP (BIRTH CONTROL PILLS) INITIAL PRESCRIPTION: ICD-10-CM

## 2021-04-06 NOTE — TELEPHONE ENCOUNTER
Majorhart sent to see how pt's sx are doing.  Started this in Nov for fatigue and cramping with periods.    Marietta Walker RN

## 2021-04-06 NOTE — TELEPHONE ENCOUNTER
vienva      Last Written Prescription Date:  11/18/20  Last Fill Quantity: 28,   # refills: 3  Last Office Visit: 11/18/20  Future Office visit:

## 2021-04-09 RX ORDER — LEVONORGESTREL/ETHIN.ESTRADIOL 0.1-0.02MG
1 TABLET ORAL DAILY
Qty: 28 TABLET | Refills: 3 | Status: SHIPPED | OUTPATIENT
Start: 2021-04-09 | End: 2021-07-05

## 2021-04-19 ENCOUNTER — TELEPHONE (OUTPATIENT)
Dept: PEDIATRICS | Facility: CLINIC | Age: 31
End: 2021-04-19

## 2021-04-19 NOTE — LETTER
April 23, 2021      Jeny Beasley  [address]   [place] [facility] [place] [address]        Dear Jeny,       We care about your health and have reviewed your health plan including your medical conditions, medications, and lab results.  Based on this review, it is recommended that you follow up regarding the following health topic(s):  -Cervical Cancer Screening  -Wellness (Physical) Visit     We recommend you take the following action(s):  -schedule a WELLNESS (Physical) APPOINTMENT.  We will perform the following labs: pap smear.     Please call us at the [facility] - [phone] (or use [employer]) to address the above recommendations.     Thank you for trusting [facility] and we appreciate the opportunity to serve you.  We look forward to supporting your healthcare needs in the future.    Healthy Regards,    Your Health Care Team  [facility]

## 2021-04-19 NOTE — TELEPHONE ENCOUNTER
Patient Quality Outreach      Summary:    Patient has the following on Jeny Beasley's problem list/HM: None    Patient is due/failing the following:   Cervical Cancer Screening - PAP Needed and Adult/Adolescent physical, date due: 1990    Type of outreach:    Sent [employer] message.    Questions for provider review:    None                                                 Tamica Corbin CMA    Chart routed to Care Team.

## 2021-04-23 NOTE — TELEPHONE ENCOUNTER
Patient Quality Outreach 2nd Attempt      Summary:    Type of outreach:    Sent letter.    Next Steps:  Reach out within 90 days via Phone.    Max number of attempts reached: No. Will try again in 90 days if patient still on fail list.    Questions for provider review:    None                                                                                                                    Tamica Corbin CMA    Chart routed to Care Team.

## 2021-04-25 ENCOUNTER — HEALTH MAINTENANCE LETTER (OUTPATIENT)
Age: 31
End: 2021-04-25

## 2021-04-30 NOTE — TELEPHONE ENCOUNTER
Patient Quality Outreach 3rd Attempt      Summary:    Type of outreach:    Phone, left message for patient/parent to call back.    Max number of attempts reached: Yes. Will try again in 90 days if patient still on fail list.    Questions for provider review:    None                                                                                                                    Tamica Corbin CMA    Chart closed.

## 2021-07-05 DIAGNOSIS — Z30.011 ENCOUNTER FOR BCP (BIRTH CONTROL PILLS) INITIAL PRESCRIPTION: ICD-10-CM

## 2021-07-05 RX ORDER — LEVONORGESTREL/ETHIN.ESTRADIOL 0.1-0.02MG
1 TABLET ORAL DAILY
Qty: 28 TABLET | Refills: 4 | Status: SHIPPED | OUTPATIENT
Start: 2021-07-05 | End: 2021-12-27

## 2021-07-05 NOTE — TELEPHONE ENCOUNTER
VIENVA      Last Written Prescription Date:  04/09/2021  Last Fill Quantity: 28 ,   # refills: 3  Last Office Visit: 11/18/2020  Future Office visit: None

## 2021-10-10 ENCOUNTER — HEALTH MAINTENANCE LETTER (OUTPATIENT)
Age: 31
End: 2021-10-10

## 2021-10-18 ENCOUNTER — TELEPHONE (OUTPATIENT)
Dept: PEDIATRICS | Facility: CLINIC | Age: 31
End: 2021-10-18

## 2021-10-18 NOTE — LETTER
October 21, 2021      Jeny Beasley  [address]   [facility] [address]        Dear Jeny,       We care about your health and have reviewed your health plan including your medical conditions, medications, and lab results.  Based on this review, it is recommended that you follow up regarding the following health topic(s):  -Cervical Cancer Screening  -Wellness (Physical) Visit     We recommend you take the following action(s):  -schedule a WELLNESS (Physical) APPOINTMENT.  We will perform the following labs: pap smear.     Please call us at the [facility] - [phone] (or use [employer]) to address the above recommendations.     Thank you for trusting [facility] and we appreciate the opportunity to serve you.  We look forward to supporting your healthcare needs in the future.    Healthy Regards,    Your Health Care Team  [facility]

## 2021-10-18 NOTE — TELEPHONE ENCOUNTER
Patient Quality Outreach      Summary:    Patient has the following on Jeny Beasley's problem list/HM: None    Patient is due/failing the following:   Cervical Cancer Screening - PAP Needed  Physical  - due anytime - never done    Type of outreach:    Sent [facility] message.    Questions for provider review:    None                                             Tamica Corbin CMA    Chart routed to Care Team.

## 2021-11-01 NOTE — TELEPHONE ENCOUNTER
Patient Quality Outreach    Patient Quality Outreach 3rd Attempt    Summary:    Type of outreach:    Phone, left message for patient/parent to call back.    Max number of attempts reached: Yes. Will try again in 90 days if patient still on fail list.    Questions for provider review:    None                                                                                                                    Tamica Corbin CMA    Chart closed.

## 2021-12-27 DIAGNOSIS — Z30.011 ENCOUNTER FOR BCP (BIRTH CONTROL PILLS) INITIAL PRESCRIPTION: ICD-10-CM

## 2021-12-27 RX ORDER — LEVONORGESTREL/ETHIN.ESTRADIOL 0.1-0.02MG
1 TABLET ORAL DAILY
Qty: 28 TABLET | Refills: 0 | Status: SHIPPED | OUTPATIENT
Start: 2021-12-27 | End: 2022-05-27

## 2021-12-27 NOTE — TELEPHONE ENCOUNTER
vienva      Last Written Prescription Date:  7/5/21  Last Fill Quantity: 28,   # refills: 4  Last Office Visit: 11/18/20  Future Office visit:

## 2022-01-24 DIAGNOSIS — Z30.011 ENCOUNTER FOR BCP (BIRTH CONTROL PILLS) INITIAL PRESCRIPTION: ICD-10-CM

## 2022-01-24 NOTE — TELEPHONE ENCOUNTER
[facility]ienva      Last Written Prescription Date:  12/27/21  Last Fill Quantity: 28,   # refills: 0  Last Office Visit: 11/18/20  Future Office visit:

## 2022-01-31 RX ORDER — LEVONORGESTREL/ETHIN.ESTRADIOL 0.1-0.02MG
1 TABLET ORAL DAILY
Qty: 28 TABLET | Refills: 0 | OUTPATIENT
Start: 2022-01-31

## 2022-05-21 ENCOUNTER — HEALTH MAINTENANCE LETTER (OUTPATIENT)
Age: 32
End: 2022-05-21

## 2022-05-26 SDOH — ECONOMIC STABILITY: INCOME INSECURITY: IN THE LAST 12 MONTHS, WAS THERE A TIME WHEN YOU WERE NOT ABLE TO PAY THE MORTGAGE OR RENT ON TIME?: NO

## 2022-05-26 SDOH — HEALTH STABILITY: PHYSICAL HEALTH: ON AVERAGE, HOW MANY DAYS PER WEEK DO YOU ENGAGE IN MODERATE TO STRENUOUS EXERCISE (LIKE A BRISK WALK)?: 3 DAYS

## 2022-05-26 SDOH — ECONOMIC STABILITY: FOOD INSECURITY: WITHIN THE PAST 12 MONTHS, YOU WORRIED THAT YOUR FOOD WOULD RUN OUT BEFORE YOU GOT MONEY TO BUY MORE.: NEVER TRUE

## 2022-05-26 SDOH — HEALTH STABILITY: PHYSICAL HEALTH: ON AVERAGE, HOW MANY MINUTES DO YOU ENGAGE IN EXERCISE AT THIS LEVEL?: 30 MIN

## 2022-05-26 SDOH — ECONOMIC STABILITY: TRANSPORTATION INSECURITY
IN THE PAST 12 MONTHS, HAS THE LACK OF TRANSPORTATION KEPT YOU FROM MEDICAL APPOINTMENTS OR FROM GETTING MEDICATIONS?: NO

## 2022-05-26 SDOH — ECONOMIC STABILITY: INCOME INSECURITY: HOW HARD IS IT FOR YOU TO PAY FOR THE VERY BASICS LIKE FOOD, HOUSING, MEDICAL CARE, AND HEATING?: PATIENT DECLINED

## 2022-05-26 SDOH — ECONOMIC STABILITY: FOOD INSECURITY: WITHIN THE PAST 12 MONTHS, THE FOOD YOU BOUGHT JUST DIDN'T LAST AND YOU DIDN'T HAVE MONEY TO GET MORE.: NEVER TRUE

## 2022-05-26 SDOH — ECONOMIC STABILITY: TRANSPORTATION INSECURITY
IN THE PAST 12 MONTHS, HAS LACK OF TRANSPORTATION KEPT YOU FROM MEETINGS, WORK, OR FROM GETTING THINGS NEEDED FOR DAILY LIVING?: NO

## 2022-05-26 ASSESSMENT — SOCIAL DETERMINANTS OF HEALTH (SDOH)
DO YOU BELONG TO ANY CLUBS OR ORGANIZATIONS SUCH AS CHURCH GROUPS UNIONS, FRATERNAL OR ATHLETIC GROUPS, OR SCHOOL GROUPS?: NO
IN A TYPICAL WEEK, HOW MANY TIMES DO YOU TALK ON THE PHONE WITH FAMILY, FRIENDS, OR NEIGHBORS?: ONCE A WEEK
HOW OFTEN DO YOU ATTEND CHURCH OR RELIGIOUS SERVICES?: MORE THAN 4 TIMES PER YEAR
ARE YOU MARRIED, WIDOWED, DIVORCED, SEPARATED, NEVER MARRIED, OR LIVING WITH A PARTNER?: NEVER MARRIED
HOW OFTEN DO YOU GET TOGETHER WITH FRIENDS OR RELATIVES?: MORE THAN THREE TIMES A WEEK

## 2022-05-26 ASSESSMENT — LIFESTYLE VARIABLES
HOW OFTEN DO YOU HAVE A DRINK CONTAINING ALCOHOL: 2-4 TIMES A MONTH
HOW OFTEN DO YOU HAVE SIX OR MORE DRINKS ON ONE OCCASION: NEVER
HOW MANY STANDARD DRINKS CONTAINING ALCOHOL DO YOU HAVE ON A TYPICAL DAY: 1 OR 2
AUDIT-C TOTAL SCORE: 2
SKIP TO QUESTIONS 9-10: 1

## 2022-05-27 ENCOUNTER — OFFICE VISIT (OUTPATIENT)
Dept: PEDIATRICS | Facility: CLINIC | Age: 32
End: 2022-05-27
Payer: COMMERCIAL

## 2022-05-27 VITALS
WEIGHT: 166.2 LBS | SYSTOLIC BLOOD PRESSURE: 122 MMHG | TEMPERATURE: 98.9 F | RESPIRATION RATE: 18 BRPM | HEIGHT: 65 IN | DIASTOLIC BLOOD PRESSURE: 74 MMHG | HEART RATE: 84 BPM | BODY MASS INDEX: 27.69 KG/M2 | OXYGEN SATURATION: 98 %

## 2022-05-27 DIAGNOSIS — N63.0 LUMP OR MASS IN BREAST: ICD-10-CM

## 2022-05-27 DIAGNOSIS — F33.0 MILD RECURRENT MAJOR DEPRESSION (H): ICD-10-CM

## 2022-05-27 DIAGNOSIS — Z12.4 CERVICAL CANCER SCREENING: ICD-10-CM

## 2022-05-27 DIAGNOSIS — Z00.00 ROUTINE GENERAL MEDICAL EXAMINATION AT A HEALTH CARE FACILITY: Primary | ICD-10-CM

## 2022-05-27 PROCEDURE — G0145 SCR C/V CYTO,THINLAYER,RESCR: HCPCS | Performed by: INTERNAL MEDICINE

## 2022-05-27 PROCEDURE — 99395 PREV VISIT EST AGE 18-39: CPT | Performed by: INTERNAL MEDICINE

## 2022-05-27 PROCEDURE — 99214 OFFICE O/P EST MOD 30 MIN: CPT | Mod: 25 | Performed by: INTERNAL MEDICINE

## 2022-05-27 RX ORDER — SERTRALINE HYDROCHLORIDE 25 MG/1
25 TABLET, FILM COATED ORAL DAILY
Qty: 90 TABLET | Refills: 0 | Status: SHIPPED | OUTPATIENT
Start: 2022-05-27 | End: 2022-08-22

## 2022-05-27 ASSESSMENT — ENCOUNTER SYMPTOMS
DIARRHEA: 0
NAUSEA: 0
PARESTHESIAS: 0
CONSTIPATION: 1
SHORTNESS OF BREATH: 0
EYE PAIN: 0
HEADACHES: 0
COUGH: 0
WEAKNESS: 0
NERVOUS/ANXIOUS: 1
ABDOMINAL PAIN: 0
FEVER: 0
MYALGIAS: 0
CONSTIPATION: 0
HEARTBURN: 0
BREAST MASS: 1
PALPITATIONS: 0
DIZZINESS: 0
JOINT SWELLING: 0
CHILLS: 0
HEMATOCHEZIA: 0
SORE THROAT: 0
ARTHRALGIAS: 0
FREQUENCY: 0
HEMATURIA: 0
DYSURIA: 0

## 2022-05-27 ASSESSMENT — PATIENT HEALTH QUESTIONNAIRE - PHQ9
10. IF YOU CHECKED OFF ANY PROBLEMS, HOW DIFFICULT HAVE THESE PROBLEMS MADE IT FOR YOU TO DO YOUR WORK, TAKE CARE OF THINGS AT HOME, OR GET ALONG WITH OTHER PEOPLE: SOMEWHAT DIFFICULT
SUM OF ALL RESPONSES TO PHQ QUESTIONS 1-9: 7
SUM OF ALL RESPONSES TO PHQ QUESTIONS 1-9: 7

## 2022-05-27 ASSESSMENT — PAIN SCALES - GENERAL: PAINLEVEL: NO PAIN (0)

## 2022-05-27 NOTE — PROGRESS NOTES
SUBJECTIVE:   CC: Jeny Beasley is an 31 year old woman who presents for preventive health visit.     Healthy Habits:     Getting at least 3 servings of Calcium per day:  NO    Bi-annual eye exam:  NO (Incomplete)    Dental care twice a year:  NO (Incomplete)    Sleep apnea or symptoms of sleep apnea:  Daytime drowsiness (Incomplete)    Diet:  Regular (no restrictions)    Frequency of exercise:  2-3 days/week    Duration of exercise:  15-30 minutes    Taking medications regularly:  Yes (Incomplete)    Medication side effects:  None (Incomplete)    PHQ-2 Total Score: 2     Today is my first visit with Jeny.  Here for CPE.    Is due for pap.  She noted a lump in her right upper outer breast tissue 5-6 weeks ago. Is still present. No pain. No nipple discharge.     Depression/anxiety history. Started seeing a therapist again recently. She is still struggling with her mood. Reviewed options, she is interested in restarting medication therapy. Has used sertraline in the past w/ good success.     Today's PHQ-2 Score:   PHQ-2 ( 1999 [employer]) 5/27/2022   Q1: Little interest or pleasure in doing things 1   Q2: Feeling down, depressed or hopeless 1   PHQ-2 Score 2   Q1: Little interest or pleasure in doing things Several days   Q2: Feeling down, depressed or hopeless More than half the days   PHQ-2 Score 3     PHQ 5/27/2022   PHQ-9 Total Score 7   Q9: Thoughts of better off dead/self-harm past 2 weeks Not at all       Abuse: Current or Past (Physical, Sexual or Emotional) - No  Do you feel safe in your environment? Yes    Have you ever done Advance Care Planning? (For example, a Health Directive, POLST, or a discussion with a medical provider or your loved ones about your wishes): No, advance care planning information given to patient to review.  Patient declined advance care planning discussion at this time.    Social History     Tobacco Use     Smoking status: Never Smoker     Smokeless tobacco: Never Used   Substance Use  Topics     Alcohol use: Yes     Comment: 2-3 times per month, 2 per time     If you drink alcohol do you typically have >3 drinks per day or >7 drinks per week? No    Alcohol Use 5/27/2022   Prescreen: >3 drinks/day or >7 drinks/week? No       Reviewed orders with patient.  Reviewed health maintenance and updated orders accordingly - Yes      FHS-7:   Breast CA Risk Assessment (FHS-7) 5/27/2022 5/27/2022   Did any of your first-degree relatives have breast or ovarian cancer? No No   Did any of your relatives have bilateral breast cancer? - Unknown   Did any man in your family have breast cancer? No No   Did any woman in your family have breast and ovarian cancer? No No   Did any woman in your family have breast cancer before age 50 y? No No   Do you have 2 or more relatives with breast and/or ovarian cancer? No No   Do you have 2 or more relatives with breast and/or bowel cancer? No No       Pertinent mammograms are reviewed under the imaging tab.    History of abnormal Pap smear: NO - age 21-29 PAP every 3 years recommended     Review of Systems   Constitutional: Negative for chills (Incomplete) and fever (Incomplete).   HENT: Negative for congestion (Incomplete), ear pain (Incomplete), hearing loss (Incomplete) and sore throat (Incomplete).    Eyes: Negative for pain (Incomplete) and visual disturbance (Incomplete).   Respiratory: Negative for cough (Incomplete) and shortness of breath (Incomplete).    Cardiovascular: Negative for chest pain (Incomplete) and palpitations (Incomplete).   Gastrointestinal: Positive for constipation (Incomplete). Negative for abdominal pain (Incomplete), diarrhea (Incomplete) and nausea (Incomplete).   Genitourinary: Negative for dysuria (Incomplete), frequency (Incomplete), genital sores (Incomplete), hematuria (Incomplete) and urgency (Incomplete).   Musculoskeletal: Negative for arthralgias (Incomplete), joint swelling (Incomplete) and myalgias (Incomplete).   Skin: Negative for  "rash (Incomplete).   Neurological: Negative for dizziness (Incomplete), weakness (Incomplete) and headaches (Incomplete).   Psychiatric/Behavioral: The patient is nervous/anxious (Incomplete).         OBJECTIVE:   /74 (BP Location: Right arm, Patient Position: Sitting, Cuff Size: Adult Regular)   Pulse 112   Temp 98.9  F (37.2  C) (Temporal)   Resp 18   Ht 1.651 m (5' 5\")   Wt 75.4 kg (166 lb 3.2 oz)   LMP 05/17/2022 (Exact Date)   SpO2 98%   BMI 27.66 kg/m    Physical Exam  GENERAL: healthy, alert and no distress  EYES: PERRL, EOMI  HENT: ear canals and TM's normal  NECK: no adenopathy  RESP: lungs clear to auscultation - no rales, rhonchi or wheezes  BREAST: Fibrocystic changes noted throughout both lower breasts. No nipple discharge. Smooth mass palpable right upper outer quadrant of the breast.   CV: regular rate and rhythm, normal S1 S2, no murmur, no peripheral edema and peripheral pulses strong  ABDOMEN: soft, nontender, bowel sounds normal  : Normal female external genitalia. Normal vaginal vault. no discharge in vault. Uterus, ovaries normal on bimanual examination.  MS: no gross musculoskeletal defects noted, no edema  SKIN: no suspicious lesions or rashes  NEURO: Normal strength and tone  PSYCH: mentation appears normal, affect normal/bright        ASSESSMENT/PLAN:       ICD-10-CM    1. Routine general medical examination at a health care facility  Z00.00    2. Cervical cancer screening  Z12.4 Pap Screen reflex to HPV if ASCUS - recommend age 25 - 29   3. Mild recurrent major depression (H)  F33.0 sertraline (ZOLOFT) 25 MG tablet   4. Lump or mass in breast  N63.0 MA Diagnostic Digital Bilateral     US Breast Right Limited 1-3 Quadrants     Depression. Increased compared to her typical baseline. Will restart sertraline. Recommend f/u in 2-3 months. Continue w/ counseling.    Mammogram ordered.     COUNSELING:  Reviewed preventive health counseling, as reflected in patient " instructions      Jeny Beasley reports that Jeny Beasley has never smoked. Jeny Beasley has never used smokeless tobacco.      Tommy Collins MD  [facility] [place]  Answers for HPI/ROS submitted by the patient on 5/27/2022  Blood in stool: No  heartburn: No  peripheral edema: No  mood changes: No  Skin sensation changes: No  pelvic pain: No  vaginal bleeding: No  vaginal discharge: No  tenderness: No  breast mass: Yes  breast discharge: No  impotence: No  penile discharge: No

## 2022-05-27 NOTE — PATIENT INSTRUCTIONS
Preventive Health Recommendations    Yearly exam:   See your health care provider every year in order to  Review health changes.   Discuss preventive care.      Until age 30: Get a Pap test every three years (more often if you have had an abnormal result).    Shots: Get a flu shot each year. Get a tetanus shot every 10 years.   Nutrition:   Eat at least 5 servings of fruits and vegetables each day.  Eat whole-grain bread, whole-wheat pasta and brown rice instead of white grains and rice.  Get adequate Calcium and Vitamin D.     Lifestyle  Exercise at least 150 minutes a week (30 minutes a day, 5 days of the week). This will help you control your weight and prevent disease.  Limit alcohol to one drink per day.  No smoking.   Wear sunscreen to prevent skin cancer.  See your dentist every six months for an exam and cleaning.

## 2022-06-01 ENCOUNTER — HOSPITAL ENCOUNTER (OUTPATIENT)
Dept: ULTRASOUND IMAGING | Facility: CLINIC | Age: 32
Discharge: HOME | End: 2022-06-01
Attending: INTERNAL MEDICINE
Payer: COMMERCIAL

## 2022-06-01 DIAGNOSIS — N63.0 LUMP OR MASS IN BREAST: ICD-10-CM

## 2022-06-01 LAB
BKR LAB AP GYN ADEQUACY: NORMAL
BKR LAB AP GYN INTERPRETATION: NORMAL
BKR LAB AP HPV REFLEX: NORMAL
BKR LAB AP PREVIOUS ABNORMAL: NORMAL
PATH REPORT.COMMENTS IMP SPEC: NORMAL
PATH REPORT.COMMENTS IMP SPEC: NORMAL
PATH REPORT.RELEVANT HX SPEC: NORMAL

## 2022-06-01 PROCEDURE — 76642 ULTRASOUND BREAST LIMITED: CPT | Mod: RT

## 2022-08-22 DIAGNOSIS — F33.0 MILD RECURRENT MAJOR DEPRESSION (H): ICD-10-CM

## 2022-08-22 RX ORDER — SERTRALINE HYDROCHLORIDE 25 MG/1
TABLET, FILM COATED ORAL
Qty: 90 TABLET | Refills: 0 | Status: SHIPPED | OUTPATIENT
Start: 2022-08-22 | End: 2022-11-22

## 2022-08-22 NOTE — LETTER
[facility]  [address]  [address]  [place] [address]  Phone: [phone]  Fax: [phone]        August 30, 2022      Jeny Beasley                                                                                                                                [address]   [place] [facility] [place] [facility] [place] [address]            Dear   Ramos,    We have attempted to reach you multiple times because we are concerned about your health care.  We recently provided you with a medication refill.  Many medications require routine follow-up with your Doctor.      At this time we ask that: You schedule a routine office visit with your physician for a medication follow up.     Your prescription for sertraline (ZOLOFT) 25 MG tablet: Has been refilled for 90 days only so you may have time for the above noted follow-up.    Please call our office at [phone] to schedule an appointment or you can schedule online through [employer] if you prefer.     If you have any questions or concerns, you can call the number mentioned above.      Thank you,      [facility] Care Team

## 2022-08-22 NOTE — TELEPHONE ENCOUNTER
1st attempt: called patient, no answer and unable to leave VM (VM box full). If pt calls back, please assist in scheduling appointment for further refills.    Audra Fleming MA 3:16 PM 8/22/2022

## 2022-08-22 NOTE — TELEPHONE ENCOUNTER
Medication is being filled for 1 time refill only due to:  Patient needs to be seen because med check follow up due.  Prescription approved per [facility] Refill Protocol.  Radha Lopez RN, BSN  [facility]  Routed to  for scheduling  Radha Lopez RN, BSN  [facility]

## 2022-09-18 ENCOUNTER — HEALTH MAINTENANCE LETTER (OUTPATIENT)
Age: 32
End: 2022-09-18

## 2022-11-20 DIAGNOSIS — F33.0 MILD RECURRENT MAJOR DEPRESSION (H): ICD-10-CM

## 2022-11-22 RX ORDER — SERTRALINE HYDROCHLORIDE 25 MG/1
25 TABLET, FILM COATED ORAL DAILY
Qty: 30 TABLET | Refills: 0 | Status: SHIPPED | OUTPATIENT
Start: 2022-11-22 | End: 2022-12-28

## 2022-11-22 NOTE — TELEPHONE ENCOUNTER
Several attempts to contact pt were made in August.  No visit scheduled.  Will refill med x 30 days w/ note to schedule a visit.

## 2022-11-22 NOTE — TELEPHONE ENCOUNTER
Routing refill request to provider for review/approval because:  Elevated PHQ-9  Juliana REHMAN RN, BSN  [facility]

## 2022-12-28 DIAGNOSIS — F33.0 MILD RECURRENT MAJOR DEPRESSION (H): ICD-10-CM

## 2022-12-28 RX ORDER — SERTRALINE HYDROCHLORIDE 25 MG/1
TABLET, FILM COATED ORAL
Qty: 30 TABLET | Refills: 0 | Status: SHIPPED | OUTPATIENT
Start: 2022-12-28 | End: 2023-01-31

## 2022-12-28 NOTE — TELEPHONE ENCOUNTER
Routing refill request to provider for review/approval because:  Patient needs to be seen because:  No OV within 6 months. Recent OV 5/27/22.  No updated PHQ-9.   PHQ 5/27/2022   PHQ-9 Total Score 7   Q9: Thoughts of better off dead/self-harm past 2 weeks Not at all     Harika Pastor RN  PAL ([facility])  [facility]

## 2023-01-31 DIAGNOSIS — F33.0 MILD RECURRENT MAJOR DEPRESSION (H): ICD-10-CM

## 2023-01-31 RX ORDER — SERTRALINE HYDROCHLORIDE 25 MG/1
25 TABLET, FILM COATED ORAL DAILY
Qty: 30 TABLET | Refills: 0 | Status: SHIPPED | OUTPATIENT
Start: 2023-01-31 | End: 2023-03-06

## 2023-01-31 NOTE — TELEPHONE ENCOUNTER
Several attempts to request her to schedule in the past few months, no visit scheduled.  Will refill x 30 days with another note requesting she schedule a visit.   MyChart note was sent as well.

## 2023-01-31 NOTE — TELEPHONE ENCOUNTER
Routing refill request to provider for review/approval because:  Patient does not have a PHQ-9 score less than 5 on file in the past 6 months.   Patient has not been seen in the past 6 months.     PHQ 5/27/2022   PHQ-9 Total Score 7   Q9: Thoughts of better off dead/self-harm past 2 weeks Not at all     Maryam DEVRIES RN   [facility]

## 2023-03-04 DIAGNOSIS — F33.0 MILD RECURRENT MAJOR DEPRESSION (H): ICD-10-CM

## 2023-03-06 RX ORDER — SERTRALINE HYDROCHLORIDE 25 MG/1
25 TABLET, FILM COATED ORAL DAILY
Qty: 30 TABLET | Refills: 0 | Status: SHIPPED | OUTPATIENT
Start: 2023-03-06 | End: 2023-04-05

## 2023-03-06 NOTE — TELEPHONE ENCOUNTER
Routing refill request to provider for review/approval because:  Maryam given x1 and patient did not follow up, please advise  PHQ-9 score:    PHQ 5/27/2022   PHQ-9 Total Score 7   Q9: Thoughts of better off dead/self-harm past 2 weeks Not at all

## 2023-04-04 DIAGNOSIS — F33.0 MILD RECURRENT MAJOR DEPRESSION (H): ICD-10-CM

## 2023-04-05 ENCOUNTER — OFFICE VISIT (OUTPATIENT)
Dept: URGENT CARE | Facility: URGENT CARE | Age: 33
End: 2023-04-05
Payer: COMMERCIAL

## 2023-04-05 VITALS
SYSTOLIC BLOOD PRESSURE: 124 MMHG | OXYGEN SATURATION: 97 % | DIASTOLIC BLOOD PRESSURE: 72 MMHG | TEMPERATURE: 98.8 F | HEART RATE: 117 BPM

## 2023-04-05 DIAGNOSIS — R42 DIZZINESS: ICD-10-CM

## 2023-04-05 DIAGNOSIS — R53.83 FATIGUE, UNSPECIFIED TYPE: Primary | ICD-10-CM

## 2023-04-05 LAB
ALBUMIN SERPL-MCNC: 3.9 G/DL (ref 3.4–5)
ALP SERPL-CCNC: 66 U/L (ref 40–150)
ALT SERPL W P-5'-P-CCNC: 36 U/L (ref 0–70)
ANION GAP SERPL CALCULATED.3IONS-SCNC: 7 MMOL/L (ref 3–14)
AST SERPL W P-5'-P-CCNC: 37 U/L (ref 0–45)
BASOPHILS # BLD AUTO: 0 10E3/UL (ref 0–0.2)
BASOPHILS NFR BLD AUTO: 1 %
BILIRUB SERPL-MCNC: 0.9 MG/DL (ref 0.2–1.3)
BUN SERPL-MCNC: 12 MG/DL (ref 7–30)
CALCIUM SERPL-MCNC: 9.9 MG/DL (ref 8.5–10.1)
CHLORIDE BLD-SCNC: 102 MMOL/L (ref 94–109)
CO2 SERPL-SCNC: 30 MMOL/L (ref 20–32)
CREAT SERPL-MCNC: 0.9 MG/DL (ref 0.52–1.25)
EOSINOPHIL # BLD AUTO: 0.1 10E3/UL (ref 0–0.7)
EOSINOPHIL NFR BLD AUTO: 2 %
ERYTHROCYTE [DISTWIDTH] IN BLOOD BY AUTOMATED COUNT: 11.5 % (ref 10–15)
GFR SERPL CREATININE-BSD FRML MDRD: 88 ML/MIN/1.73M2
GLUCOSE BLD-MCNC: 111 MG/DL (ref 70–99)
HCT VFR BLD AUTO: 44.4 % (ref 35–53)
HGB BLD-MCNC: 15.8 G/DL (ref 11.7–17.7)
LYMPHOCYTES # BLD AUTO: 2 10E3/UL (ref 0.8–5.3)
LYMPHOCYTES NFR BLD AUTO: 33 %
MCH RBC QN AUTO: 31.1 PG (ref 26.5–33)
MCHC RBC AUTO-ENTMCNC: 35.6 G/DL (ref 31.5–36.5)
MCV RBC AUTO: 87 FL (ref 78–100)
MONOCYTES # BLD AUTO: 0.4 10E3/UL (ref 0–1.3)
MONOCYTES NFR BLD AUTO: 7 %
MONOCYTES NFR BLD AUTO: NEGATIVE %
NEUTROPHILS # BLD AUTO: 3.5 10E3/UL (ref 1.6–8.3)
NEUTROPHILS NFR BLD AUTO: 58 %
PLATELET # BLD AUTO: 257 10E3/UL (ref 150–450)
POTASSIUM BLD-SCNC: 5 MMOL/L (ref 3.4–5.3)
PROT SERPL-MCNC: 7.9 G/DL (ref 6.8–8.8)
RBC # BLD AUTO: 5.08 10E6/UL (ref 3.8–5.9)
SODIUM SERPL-SCNC: 139 MMOL/L (ref 133–144)
TSH SERPL DL<=0.005 MIU/L-ACNC: 1.36 UIU/ML (ref 0.3–4.2)
WBC # BLD AUTO: 6 10E3/UL (ref 4–11)

## 2023-04-05 PROCEDURE — 36415 COLL VENOUS BLD VENIPUNCTURE: CPT | Performed by: FAMILY MEDICINE

## 2023-04-05 PROCEDURE — 82306 VITAMIN D 25 HYDROXY: CPT | Performed by: FAMILY MEDICINE

## 2023-04-05 PROCEDURE — 86308 HETEROPHILE ANTIBODY SCREEN: CPT | Performed by: FAMILY MEDICINE

## 2023-04-05 PROCEDURE — 80050 GENERAL HEALTH PANEL: CPT | Performed by: FAMILY MEDICINE

## 2023-04-05 PROCEDURE — 99214 OFFICE O/P EST MOD 30 MIN: CPT | Performed by: FAMILY MEDICINE

## 2023-04-05 RX ORDER — SERTRALINE HYDROCHLORIDE 25 MG/1
TABLET, FILM COATED ORAL
Qty: 30 TABLET | Refills: 0 | Status: SHIPPED | OUTPATIENT
Start: 2023-04-05 | End: 2023-05-05

## 2023-04-05 NOTE — PROGRESS NOTES
SUBJECTIVE:   Jeny Beasley is a 32 year old adult presenting with a chief complaint of fatigue, off balance, vomiting and chills.  Feels more achy.  Denies any sore throat, cough or congestion  Onset of symptoms was 1 1/2 week(s) ago.  Course of illness is worsening.    Severity moderate  Current and Associated symptoms: fatigue  Treatment measures tried include Fluids and Rest.  Predisposing factors include None.    Completed COVID vaccinations, boosted  Home rapid COVID test negative - this weekend  No close ill contact    Had vomiting once last week, this resolved.  Stools more loose but denies any diarrhea.  No fever.    Menses monthly, uses condoms, does not think that is pregnant    Has not been able to go to work since illness on 3/25 needs work note    Past Medical History:   Diagnosis Date     Anxiety      Depressive disorder      Current Outpatient Medications   Medication Sig Dispense Refill     diphenhydrAMINE (BENADRYL) 25 MG tablet Take 25 mg by mouth nightly as needed for itching or allergies       ibuprofen (ADVIL/MOTRIN) 200 MG tablet Take 200 mg by mouth every 4 hours as needed for mild pain       MULTIPLE VITAMIN PO        Acetaminophen (MIDOL PO)        sertraline (ZOLOFT) 25 MG tablet TAKE 1 TABLET BY MOUTH EVERY DAY. NEED OFFICE VISIT FOR FURTHER REFILL. 30 tablet 0     Social History     Tobacco Use     Smoking status: Never     Passive exposure: Never     Smokeless tobacco: Never   Vaping Use     Vaping status: Not on file   Substance Use Topics     Alcohol use: Yes     Comment: 2-3 times per month, 2 per time       ROS:  Review of systems negative except as stated above.    OBJECTIVE:  /72   Pulse 117   Temp 98.8  F (37.1  C) (Tympanic)   LMP 04/02/2023   SpO2 97%   GENERAL APPEARANCE: healthy, alert and no distress  PSYCH: mentation appears normal and affect normal/bright    Results for orders placed or performed in visit on 04/05/23   Comprehensive metabolic panel     Status:  Abnormal   Result Value Ref Range    Sodium 139 133 - 144 mmol/L    Potassium 5.0 3.4 - 5.3 mmol/L    Chloride 102 94 - 109 mmol/L    Carbon Dioxide (CO2) 30 20 - 32 mmol/L    Anion Gap 7 3 - 14 mmol/L    Urea Nitrogen 12 7 - 30 mg/dL    Creatinine 0.90 0.52 - 1.25 mg/dL    Calcium 9.9 8.5 - 10.1 mg/dL    Glucose 111 (H) 70 - 99 mg/dL    Alkaline Phosphatase 66 40 - 150 U/L    AST 37 0 - 45 U/L    ALT 36 0 - 70 U/L    Protein Total 7.9 6.8 - 8.8 g/dL    Albumin 3.9 3.4 - 5.0 g/dL    Bilirubin Total 0.9 0.2 - 1.3 mg/dL    GFR Estimate 88 >60 mL/min/1.73m2    Narrative    The sex of this patient cannot be reliably determined based on discrepancies in demographics (legal sex, sex assigned at birth, gender identity).  Both male and female reference ranges are provided where applicable.  Careful evaluation of the patient's results as compared to the gender specific reference intervals is required in this setting.    Mononucleosis screen     Status: Normal   Result Value Ref Range    Mononucleosis Screen Negative Negative   CBC with platelets and differential     Status: None   Result Value Ref Range    WBC Count 6.0 4.0 - 11.0 10e3/uL    RBC Count 5.08 3.80 - 5.90 10e6/uL    Hemoglobin 15.8 11.7 - 17.7 g/dL    Hematocrit 44.4 35.0 - 53.0 %    MCV 87 78 - 100 fL    MCH 31.1 26.5 - 33.0 pg    MCHC 35.6 31.5 - 36.5 g/dL    RDW 11.5 10.0 - 15.0 %    Platelet Count 257 150 - 450 10e3/uL    % Neutrophils 58 %    % Lymphocytes 33 %    % Monocytes 7 %    % Eosinophils 2 %    % Basophils 1 %    Absolute Neutrophils 3.5 1.6 - 8.3 10e3/uL    Absolute Lymphocytes 2.0 0.8 - 5.3 10e3/uL    Absolute Monocytes 0.4 0.0 - 1.3 10e3/uL    Absolute Eosinophils 0.1 0.0 - 0.7 10e3/uL    Absolute Basophils 0.0 0.0 - 0.2 10e3/uL    Narrative    The sex of this patient cannot be reliably determined based on discrepancies in demographics (legal sex, sex assigned at birth, gender identity).  Both male and female reference ranges are provided where  applicable.  Careful evaluation of the patient s results as compared to the gender specific reference intervals is required in this setting.    CBC with platelets and differential     Status: None    Narrative    The following orders were created for panel order CBC with platelets and differential.  Procedure                               Abnormality         Status                     ---------                               -----------         ------                     CBC with platelets and d...[[id number]]                      Final result                 Please view results for these tests on the individual orders.         ASSESSMENT/PLAN:  (R53.83) Fatigue, unspecified type  (primary encounter diagnosis)  Plan: TSH with free T4 reflex, CBC with platelets and        differential, Comprehensive metabolic panel,         Mononucleosis screen, Vitamin D Deficiency            (R42) Dizziness  Plan: TSH with free T4 reflex, CBC with platelets and        differential, Comprehensive metabolic panel,         Vitamin D Deficiency            Patient is not in acute distress and non-toxic appearing.  Reviewed that fatigue and dizziness most likely will require further evaluation by primary provider.  Initial labs obtained were unremarkable.  Encourage rest and plenty of fluids for now.  Will follow up on pending labs and notify if any abnormalities.    Work excuse note given, reviewed that if requires any further forms in regards to work absence that this will need to be filled out by primary provider.    Follow up with primary provider within 1 week    Jermaine Hernández MD  April 5, 2023 1:31 PM

## 2023-04-05 NOTE — TELEPHONE ENCOUNTER
Routing refill request to provider for review/approval because:  Maryam given x1 and patient did not follow up, please advise  Radha Lopez RN, BSN  [facility]

## 2023-04-05 NOTE — LETTER
April 5, 2023      Jeny Beasley  [address]   [place] [facility] [place] [address]        To Whom It May Concern:    Jeny Beasley  was seen on 4/5.  Please excuse Jeny Beasley from work 3/25 to 4/6 due to illness.        Sincerely,        Jermaine Hernández MD

## 2023-04-06 LAB — DEPRECATED CALCIDIOL+CALCIFEROL SERPL-MC: 35 UG/L (ref 20–75)

## 2023-05-03 DIAGNOSIS — F33.0 MILD RECURRENT MAJOR DEPRESSION (H): ICD-10-CM

## 2023-05-04 NOTE — TELEPHONE ENCOUNTER
Routing refill request to provider for review/approval because:  Patient needs to be seen because:  Year will be 4/27/23  Failing PHQ9    Kateryna Rosado RN

## 2023-05-05 RX ORDER — SERTRALINE HYDROCHLORIDE 25 MG/1
TABLET, FILM COATED ORAL
Qty: 30 TABLET | Refills: 0 | Status: SHIPPED | OUTPATIENT
Start: 2023-05-05 | End: 2023-06-05

## 2023-06-02 DIAGNOSIS — F33.0 MILD RECURRENT MAJOR DEPRESSION (H): ICD-10-CM

## 2023-06-05 RX ORDER — SERTRALINE HYDROCHLORIDE 25 MG/1
TABLET, FILM COATED ORAL
Qty: 30 TABLET | Refills: 0 | Status: SHIPPED | OUTPATIENT
Start: 2023-06-05 | End: 2023-06-28 | Stop reason: DRUGHIGH

## 2023-06-05 NOTE — TELEPHONE ENCOUNTER
Routing refill request to provider for review/approval because:  Failing PHQ-9 Appointment scheduled in 3 weeks  Next 5 appointments (look out 90 days)      Jun 28, 2023  2:30 PM  (Arrive by 2:10 PM)  Adult Preventative Visit with Tommy Collins MD  [facility] ([facility] ) [address] [place] [id number] [phone]          Vandana Vargas RN

## 2023-06-28 ENCOUNTER — OFFICE VISIT (OUTPATIENT)
Dept: PEDIATRICS | Facility: CLINIC | Age: 33
End: 2023-06-28
Payer: COMMERCIAL

## 2023-06-28 VITALS
DIASTOLIC BLOOD PRESSURE: 64 MMHG | RESPIRATION RATE: 16 BRPM | OXYGEN SATURATION: 98 % | WEIGHT: 171.7 LBS | HEART RATE: 94 BPM | BODY MASS INDEX: 27.59 KG/M2 | TEMPERATURE: 98.6 F | SYSTOLIC BLOOD PRESSURE: 126 MMHG | HEIGHT: 66 IN

## 2023-06-28 DIAGNOSIS — F33.0 MILD RECURRENT MAJOR DEPRESSION (H): ICD-10-CM

## 2023-06-28 DIAGNOSIS — Z00.00 ROUTINE GENERAL MEDICAL EXAMINATION AT A HEALTH CARE FACILITY: Primary | ICD-10-CM

## 2023-06-28 PROCEDURE — 99395 PREV VISIT EST AGE 18-39: CPT | Performed by: INTERNAL MEDICINE

## 2023-06-28 RX ORDER — DIPHENHYDRAMINE HCL 25 MG
25 TABLET ORAL
COMMUNITY
Start: 2023-06-28

## 2023-06-28 RX ORDER — SERTRALINE HYDROCHLORIDE 25 MG/1
25 TABLET, FILM COATED ORAL DAILY
Qty: 90 TABLET | Refills: 3 | Status: CANCELLED | OUTPATIENT
Start: 2023-06-28

## 2023-06-28 SDOH — ECONOMIC STABILITY: INCOME INSECURITY: HOW HARD IS IT FOR YOU TO PAY FOR THE VERY BASICS LIKE FOOD, HOUSING, MEDICAL CARE, AND HEATING?: NOT VERY HARD

## 2023-06-28 SDOH — ECONOMIC STABILITY: INCOME INSECURITY: IN THE LAST 12 MONTHS, WAS THERE A TIME WHEN YOU WERE NOT ABLE TO PAY THE MORTGAGE OR RENT ON TIME?: NO

## 2023-06-28 SDOH — HEALTH STABILITY: PHYSICAL HEALTH: ON AVERAGE, HOW MANY DAYS PER WEEK DO YOU ENGAGE IN MODERATE TO STRENUOUS EXERCISE (LIKE A BRISK WALK)?: 1 DAY

## 2023-06-28 SDOH — ECONOMIC STABILITY: FOOD INSECURITY: WITHIN THE PAST 12 MONTHS, YOU WORRIED THAT YOUR FOOD WOULD RUN OUT BEFORE YOU GOT MONEY TO BUY MORE.: NEVER TRUE

## 2023-06-28 SDOH — ECONOMIC STABILITY: FOOD INSECURITY: WITHIN THE PAST 12 MONTHS, THE FOOD YOU BOUGHT JUST DIDN'T LAST AND YOU DIDN'T HAVE MONEY TO GET MORE.: NEVER TRUE

## 2023-06-28 SDOH — HEALTH STABILITY: PHYSICAL HEALTH: ON AVERAGE, HOW MANY MINUTES DO YOU ENGAGE IN EXERCISE AT THIS LEVEL?: 20 MIN

## 2023-06-28 ASSESSMENT — ENCOUNTER SYMPTOMS
COUGH: 0
NAUSEA: 0
HEADACHES: 0
PARESTHESIAS: 0
JOINT SWELLING: 0
HEMATURIA: 0
WEAKNESS: 0
ABDOMINAL PAIN: 0
EYE PAIN: 0
DIZZINESS: 0
FEVER: 0
SHORTNESS OF BREATH: 0
ARTHRALGIAS: 0
HEARTBURN: 0
PALPITATIONS: 0
CONSTIPATION: 0
DIARRHEA: 0
DYSURIA: 0
FREQUENCY: 0
SORE THROAT: 0
MYALGIAS: 0
HEMATOCHEZIA: 0
CHILLS: 0

## 2023-06-28 ASSESSMENT — PATIENT HEALTH QUESTIONNAIRE - PHQ9
10. IF YOU CHECKED OFF ANY PROBLEMS, HOW DIFFICULT HAVE THESE PROBLEMS MADE IT FOR YOU TO DO YOUR WORK, TAKE CARE OF THINGS AT HOME, OR GET ALONG WITH OTHER PEOPLE: NOT DIFFICULT AT ALL
SUM OF ALL RESPONSES TO PHQ QUESTIONS 1-9: 1
SUM OF ALL RESPONSES TO PHQ QUESTIONS 1-9: 1

## 2023-06-28 ASSESSMENT — SOCIAL DETERMINANTS OF HEALTH (SDOH)
DO YOU BELONG TO ANY CLUBS OR ORGANIZATIONS SUCH AS CHURCH GROUPS UNIONS, FRATERNAL OR ATHLETIC GROUPS, OR SCHOOL GROUPS?: NO
IN A TYPICAL WEEK, HOW MANY TIMES DO YOU TALK ON THE PHONE WITH FAMILY, FRIENDS, OR NEIGHBORS?: MORE THAN THREE TIMES A WEEK
HOW OFTEN DO YOU ATTEND CHURCH OR RELIGIOUS SERVICES?: 1 TO 4 TIMES PER YEAR
ARE YOU MARRIED, WIDOWED, DIVORCED, SEPARATED, NEVER MARRIED, OR LIVING WITH A PARTNER?: NEVER MARRIED
HOW OFTEN DO YOU GET TOGETHER WITH FRIENDS OR RELATIVES?: MORE THAN THREE TIMES A WEEK

## 2023-06-28 ASSESSMENT — LIFESTYLE VARIABLES
HOW MANY STANDARD DRINKS CONTAINING ALCOHOL DO YOU HAVE ON A TYPICAL DAY: 1 OR 2
HOW OFTEN DO YOU HAVE A DRINK CONTAINING ALCOHOL: 2-4 TIMES A MONTH
AUDIT-C TOTAL SCORE: 2
SKIP TO QUESTIONS 9-10: 1
HOW OFTEN DO YOU HAVE SIX OR MORE DRINKS ON ONE OCCASION: NEVER

## 2023-06-28 ASSESSMENT — PAIN SCALES - GENERAL: PAINLEVEL: NO PAIN (0)

## 2023-06-28 NOTE — PROGRESS NOTES
SUBJECTIVE:   CC: Jeny is an 32 year old who presents for preventive health visit.       6/28/2023     2:05 PM   Additional Questions   Roomed by Samira         6/28/2023     2:05 PM   Patient Reported Additional Medications   Patient reports taking the following new medications none     Healthy Habits:     Getting at least 3 servings of Calcium per day:  NO    Bi-annual eye exam:  NO    Dental care twice a year:  NO    Sleep apnea or symptoms of sleep apnea:  None    Diet:  Regular (no restrictions)    Frequency of exercise:  None    Taking medications regularly:  Yes    Medication side effects:  None    PHQ-2 Total Score: 0    Additional concerns today:  No    Here for CPE. Overall feeling well.    Depression hx. Mood has been well controlled. She wonders if her sx could be even better controlled w/ a higher dose. Is interested in trying 50 mg daily. Continues w/ regular therapy visits.       5/27/2022     1:20 PM 6/28/2023    10:35 AM   PHQ   PHQ-9 Total Score 7 1   Q9: Thoughts of better off dead/self-harm past 2 weeks Not at all Not at all      Interest in contraception. Not sure which method. We discussed options.     Social History     Tobacco Use     Smoking status: Never     Passive exposure: Never     Smokeless tobacco: Never   Substance Use Topics     Alcohol use: Yes     Comment: 2-3 times per month, 2 per time         6/28/2023    10:36 AM   Alcohol Use   Prescreen: >3 drinks/day or >7 drinks/week? No     Reviewed orders with patient.  Reviewed health maintenance and updated orders accordingly - Yes      Breast Cancer Screening:    FHS-7:       5/27/2022     1:16 PM 5/27/2022     1:21 PM 6/1/2022     2:53 PM 6/28/2023    10:39 AM   Breast CA Risk Assessment (FHS-7)   Did any of your first-degree relatives have breast or ovarian cancer? No No No No   Did any of your relatives have bilateral breast cancer?  Unknown No Unknown   Did any man in your family have breast cancer? No No No No   Did any woman  "in your family have breast and ovarian cancer? No No No No   Did any woman in your family have breast cancer before age 50 y? No No No No   Do you have 2 or more relatives with breast and/or ovarian cancer? No No No No   Do you have 2 or more relatives with breast and/or bowel cancer? No No No No         Pertinent mammograms are reviewed under the imaging tab.    History of abnormal Pap smear: NO - age 30- 65 PAP every 3 years recommended      5/27/2022     1:40 PM   PAP / HPV   PAP Negative for Intraepithelial Lesion or Malignancy (NILM)      Reviewed and updated as needed this visit by clinical staff   Tobacco  Allergies  Meds              Reviewed and updated as needed this visit by Provider                     Review of Systems   Constitutional: Negative for chills and fever.   HENT: Positive for congestion. Negative for ear pain, hearing loss and sore throat.    Eyes: Negative for pain and visual disturbance.   Respiratory: Negative for cough and shortness of breath.    Cardiovascular: Negative for chest pain and palpitations.   Gastrointestinal: Negative for abdominal pain, constipation, diarrhea and nausea.   Genitourinary: Negative for dysuria, frequency, genital sores, hematuria and urgency.   Musculoskeletal: Negative for arthralgias, joint swelling and myalgias.   Skin: Negative for rash.   Neurological: Negative for dizziness, weakness and headaches.          OBJECTIVE:   /64 (BP Location: Right arm, Patient Position: Sitting, Cuff Size: Adult Regular)   Pulse 94   Temp 98.6  F (37  C) (Tympanic)   Resp 16   Ht 1.664 m (5' 5.5\")   Wt 77.9 kg (171 lb 11.2 oz)   LMP 06/03/2023 (Exact Date)   SpO2 98%   BMI 28.14 kg/m    Physical Exam  GENERAL: healthy, alert and no distress  EYES: PERRL, EOMI  HENT: ear canals and TM's normal  NECK: no adenopathy  RESP: lungs clear to auscultation - no rales, rhonchi or wheezes  CV: regular rate and rhythm, normal S1 S2, no murmur, no peripheral edema and " peripheral pulses strong  ABDOMEN: soft, nontender, bowel sounds normal  MS: no gross musculoskeletal defects noted  SKIN: no suspicious lesions or rashes  NEURO: Normal strength and tone  PSYCH: mentation appears normal, affect normal/bright         ASSESSMENT/PLAN:       ICD-10-CM    1. Routine general medical examination at a health care facility  Z00.00       2. Mild recurrent major depression (H)  F33.0 sertraline (ZOLOFT) 50 MG tablet        Overall doing well  OK to try sertraline 50 mg daily. If the higher dose is not effective can cut in 1/2.       COUNSELING:  Reviewed preventive health counseling, as reflected in patient instructions        Jeny Beasley reports that Jeny Beasley has never smoked. Jeny Beasley has never been exposed to tobacco smoke. Jeny Beasley has never used smokeless tobacco.      Tommy Collins MD  [facility]

## 2023-06-28 NOTE — PATIENT INSTRUCTIONS
Preventive Health Recommendations  Yearly exam:   See your health care provider every year in order to  Review health changes.   Discuss preventive care.      Shots: Get a flu shot each year. Get a tetanus shot every 10 years.   Nutrition:   Eat at least 5 servings of fruits and vegetables each day.  Eat whole-grain bread, whole-wheat pasta and brown rice instead of white grains and rice.  Get adequate Calcium and Vitamin D.     Lifestyle  Exercise at least 150 minutes a week (30 minutes a day, 5 days of the week). This will help you control your weight and prevent disease.  Limit alcohol to one drink per day.  No smoking.   Wear sunscreen to prevent skin cancer.  See your dentist every six months for an exam and cleaning.

## 2023-09-30 ENCOUNTER — OFFICE VISIT (OUTPATIENT)
Dept: URGENT CARE | Facility: URGENT CARE | Age: 33
End: 2023-09-30
Payer: COMMERCIAL

## 2023-09-30 VITALS
DIASTOLIC BLOOD PRESSURE: 78 MMHG | RESPIRATION RATE: 16 BRPM | HEIGHT: 65 IN | OXYGEN SATURATION: 96 % | SYSTOLIC BLOOD PRESSURE: 120 MMHG | BODY MASS INDEX: 28.32 KG/M2 | WEIGHT: 170 LBS | TEMPERATURE: 98.6 F | HEART RATE: 83 BPM

## 2023-09-30 DIAGNOSIS — R05.1 ACUTE COUGH: ICD-10-CM

## 2023-09-30 DIAGNOSIS — J06.9 VIRAL URI WITH COUGH: Primary | ICD-10-CM

## 2023-09-30 DIAGNOSIS — R07.0 THROAT PAIN: ICD-10-CM

## 2023-09-30 LAB
DEPRECATED S PYO AG THROAT QL EIA: NEGATIVE
GROUP A STREP BY PCR: NOT DETECTED

## 2023-09-30 PROCEDURE — 87651 STREP A DNA AMP PROBE: CPT | Performed by: FAMILY MEDICINE

## 2023-09-30 PROCEDURE — 99213 OFFICE O/P EST LOW 20 MIN: CPT | Performed by: FAMILY MEDICINE

## 2023-09-30 RX ORDER — BENZONATATE 200 MG/1
200 CAPSULE ORAL 3 TIMES DAILY PRN
Qty: 30 CAPSULE | Refills: 0 | Status: SHIPPED | OUTPATIENT
Start: 2023-09-30

## 2023-09-30 RX ORDER — CODEINE PHOSPHATE AND GUAIFENESIN 10; 100 MG/5ML; MG/5ML
2 SOLUTION ORAL EVERY 6 HOURS PRN
Qty: 118 ML | Refills: 0 | Status: SHIPPED | OUTPATIENT
Start: 2023-09-30

## 2023-09-30 NOTE — PROGRESS NOTES
"SUBJECTIVE:   Jeny Beasley is a 33 year old adult presenting with a chief complaint of cough, sore throat, fatigue, body aches, low grade fever.  No SOB.  No sinus pressure  Onset of symptoms was 5 day(s) ago.  Course of illness is worsening.    Severity moderate  Current and Associated symptoms: cough, fatigue, sore throat  Treatment measures tried include Tylenol/Ibuprofen, Antihistamine, OTC Cough med, Fluids, and Rest.  Predisposing factors include None.    Home rapid COVID test negative    No close positive strep or COVID exposure    Past Medical History:   Diagnosis Date    Anxiety     Depressive disorder      Current Outpatient Medications   Medication Sig Dispense Refill    ibuprofen (ADVIL/MOTRIN) 200 MG tablet Take 200 mg by mouth every 4 hours as needed for mild pain      MULTIPLE VITAMIN PO       sertraline (ZOLOFT) 50 MG tablet Take 1 tablet (50 mg) by mouth daily 90 tablet 3    Acetaminophen (MIDOL PO)       diphenhydrAMINE (BENADRYL) 25 MG tablet Take 1 tablet (25 mg) by mouth nightly as needed for allergies or sleep       Social History     Tobacco Use    Smoking status: Never     Passive exposure: Never    Smokeless tobacco: Never   Substance Use Topics    Alcohol use: Yes     Comment: 2-3 times per month, 2 per time       ROS:  Review of systems negative except as stated above.    OBJECTIVE:  /78   Pulse 83   Temp 98.6  F (37  C) (Tympanic)   Resp 16   Ht 1.651 m (5' 5\")   Wt 77.1 kg (170 lb)   SpO2 96%   BMI 28.29 kg/m    GENERAL APPEARANCE: healthy, alert and no distress  EYES: EOMI,  PERRL, conjunctiva clear  HENT: ear canals and TM's normal.  Nose and mouth without ulcers, erythema or lesions  RESP: lungs clear to auscultation - no rales, rhonchi or wheezes  CV: regular rates and rhythm, normal S1 S2, no murmur noted  PSYCH: mentation appears normal and affect normal/bright    Results for orders placed or performed in visit on 09/30/23   Streptococcus A Rapid Screen w/Reflex to PCR " - Clinic Collect     Status: Normal    Specimen: Throat; Swab   Result Value Ref Range    Group A Strep antigen Negative Negative       ASSESSMENT/PLAN:  (J06.9) Viral URI with cough  (primary encounter diagnosis)  Plan: benzonatate (TESSALON) 200 MG capsule,         guaiFENesin-codeine (ROBITUSSIN AC) 100-10         MG/5ML solution            (R07.0) Throat pain  Comment: viral  Plan: Streptococcus A Rapid Screen w/Reflex to PCR -         Clinic Collect, Group A Streptococcus PCR         Throat Swab            (R05.1) Acute cough  Comment: viral  Plan: benzonatate (TESSALON) 200 MG capsule,         guaiFENesin-codeine (ROBITUSSIN AC) 100-10         MG/5ML solution            Reassurance given, reviewed symptomatic treatment with tylenol, ibuprofen, plenty of fluids and rest.  Will follow up on throat culture and treat if positive for strep.  RX tessalon perles and RX aleyda AC given to help with cough.      Follow up with primary provider if no improvement of symptoms in 1 week    Jermaine Hernández MD  September 30, 2023 11:54 AM

## 2023-12-16 ENCOUNTER — E-VISIT (OUTPATIENT)
Dept: URGENT CARE | Facility: CLINIC | Age: 33
End: 2023-12-16
Payer: COMMERCIAL

## 2023-12-16 DIAGNOSIS — J06.9 VIRAL URI WITH COUGH: Primary | ICD-10-CM

## 2023-12-16 PROCEDURE — 99207 PR NON-BILLABLE SERV PER CHARTING: CPT | Performed by: PHYSICIAN ASSISTANT

## 2023-12-16 RX ORDER — BENZONATATE 100 MG/1
100 CAPSULE ORAL 3 TIMES DAILY PRN
Qty: 30 CAPSULE | Refills: 0 | Status: SHIPPED | OUTPATIENT
Start: 2023-12-16

## 2023-12-16 NOTE — PATIENT INSTRUCTIONS
Viral Respiratory Infection: Care Instructions  Overview     A viral respiratory infection is an infection of the nose, sinuses, or throat caused by a virus. Colds and the flu are common types of viral respiratory infections.  The symptoms of a viral respiratory infection often start quickly. They include a fever, sore throat, and runny nose. You may also just not feel well. Or you may not want to eat much.  Most viral infections can be treated with home care. This may include drinking lots of fluids and taking over-the-counter pain medicine. You will probably feel better in 4 to 10 days.  Antibiotics are not used to treat a viral infection. Antibiotics don't kill viruses, so they won't help cure a viral illness.  In some cases, a doctor may prescribe antiviral medicine to help your body fight a serious viral infection.  Follow-up care is a key part of your treatment and safety. Be sure to make and go to all appointments, and call your doctor if you are having problems. It's also a good idea to know your test results and keep a list of the medicines you take.  How can you care for yourself at home?  To prevent dehydration, drink plenty of fluids. Choose water and other clear liquids until you feel better. If you have kidney, heart, or liver disease and have to limit fluids, talk with your doctor before you increase the amount of fluids you drink.  Ask your doctor if you can take an over-the-counter pain medicine, such as acetaminophen (Tylenol), ibuprofen (Advil, Motrin), or naproxen (Aleve). Be safe with medicines. Read and follow all instructions on the label. No one younger than 20 should take aspirin. It has been linked to Reye syndrome, a serious illness.  Be careful when taking over-the-counter cold or flu medicines and Tylenol at the same time. Many of these medicines have acetaminophen, which is Tylenol. Read the labels to make sure that you are not taking more than the recommended dose. Too much  "acetaminophen (Tylenol) can be harmful.  Get plenty of rest.  Use saline (saltwater) nasal washes to help keep your nasal passages open and wash out mucus and allergens. You can buy saline nose sprays at a grocery store or drugstore. Follow the instructions on the package. Or you can make your own at home. Add 1 teaspoon of non-iodized salt and 1 teaspoon of baking soda to 2 cups of distilled or boiled and cooled water. Fill a squeeze bottle or neti pot with the nasal wash. Then put the tip into your nostril, and lean over the sink. With your mouth open, gently squirt the liquid. Repeat on the other side.  Use a vaporizer or humidifier to add moisture to your bedroom. Follow the instructions for cleaning the machine.  Do not smoke or allow others to smoke around you. If you need help quitting, talk to your doctor about stop-smoking programs and medicines. These can increase your chances of quitting for good.  When should you call for help?   Call 911 anytime you think you may need emergency care. For example, call if:    You have severe trouble breathing.   Call your doctor now or seek immediate medical care if:    You have a new or higher fever.     Your fever lasts more than 48 hours.     You have trouble breathing.     You have a fever with a stiff neck or a severe headache.     You are sensitive to light.     You feel very sleepy or confused.   Watch closely for changes in your health, and be sure to contact your doctor if:    You do not get better as expected.   Where can you learn more?  Go to https://www.[employer].net/patiented  Enter Q795 in the search box to learn more about \"Viral Respiratory Infection: Care Instructions.\"  Current as of: June 13, 2023               Content Version: 13.8    7605-8068 [employer].   Care instructions adapted under license by your healthcare professional. If you have questions about a medical condition or this instruction, always ask your healthcare " professional. [employer] disclaims any warranty or liability for your use of this information.

## 2024-06-30 DIAGNOSIS — F33.0 MILD RECURRENT MAJOR DEPRESSION (H): ICD-10-CM

## 2024-09-22 ENCOUNTER — HEALTH MAINTENANCE LETTER (OUTPATIENT)
Age: 34
End: 2024-09-22

## 2024-09-26 DIAGNOSIS — F33.0 MILD RECURRENT MAJOR DEPRESSION (H): ICD-10-CM

## 2024-10-30 ENCOUNTER — TELEPHONE (OUTPATIENT)
Dept: PEDIATRICS | Facility: CLINIC | Age: 34
End: 2024-10-30
Payer: COMMERCIAL

## 2024-10-30 DIAGNOSIS — F33.0 MILD RECURRENT MAJOR DEPRESSION (H): ICD-10-CM

## 2024-10-30 NOTE — TELEPHONE ENCOUNTER
Please call pt.  Due for a visit with me. Once scheduled route back to me to sign rx for sertraline.

## 2024-10-30 NOTE — TELEPHONE ENCOUNTER
I was not able to LVM, I sent pt a mcm to schedule an annual f/u appt with Dr Collins. left the number [phone].

## 2024-10-30 NOTE — TELEPHONE ENCOUNTER
I was not able to LVM, I sent pt a mcm to schedule an annual f/u appt with Dr Collins. left the number [phone].

## 2024-11-13 ENCOUNTER — OFFICE VISIT (OUTPATIENT)
Dept: PEDIATRICS | Facility: CLINIC | Age: 34
End: 2024-11-13
Payer: COMMERCIAL

## 2024-11-13 VITALS
HEIGHT: 65 IN | HEART RATE: 109 BPM | BODY MASS INDEX: 29.42 KG/M2 | DIASTOLIC BLOOD PRESSURE: 68 MMHG | OXYGEN SATURATION: 98 % | WEIGHT: 176.6 LBS | TEMPERATURE: 98.6 F | SYSTOLIC BLOOD PRESSURE: 106 MMHG | RESPIRATION RATE: 18 BRPM

## 2024-11-13 DIAGNOSIS — Z00.00 ROUTINE GENERAL MEDICAL EXAMINATION AT A HEALTH CARE FACILITY: Primary | ICD-10-CM

## 2024-11-13 DIAGNOSIS — F33.0 MILD RECURRENT MAJOR DEPRESSION (H): ICD-10-CM

## 2024-11-13 PROCEDURE — 99395 PREV VISIT EST AGE 18-39: CPT | Performed by: INTERNAL MEDICINE

## 2024-11-13 NOTE — PROGRESS NOTES
"Preventive Care Visit  [facility]  Tommy Collins MD, Internal Medicine - Pediatrics  Nov 13, 2024      Assessment & Plan       ICD-10-CM    1. Routine general medical examination at a health care facility  Z00.00       2. Mild recurrent major depression (H)  F33.0 sertraline (ZOLOFT) 50 MG tablet        Here for CPE. Overall she is doing well.    Depression. Mood is under good control.           BMI  Estimated body mass index is 29.25 kg/m  as calculated from the following:    Height as of this encounter: 1.655 m (5' 5.15\").    Weight as of this encounter: 80.1 kg (176 lb 9.6 oz).   Weight management plan: Discussed healthy diet and exercise guidelines      Tommy Collins MD      Jason Fermin is a 34 year old, presenting for the following:  Physical        11/13/2024     1:27 PM   Additional Questions   Roomed by [place]         11/13/2024     1:27 PM   Patient Reported Additional Medications   Patient reports taking the following new medications None          HPI  Here for CPE. Overall she is feeling well. No acute concerns today.     SO has been dx w/ sarcoid. She is unsure whether she wants flu/COVID vaccines d/t him taking prednisone. I recommend she get vaccines now, but will consider.     Depression. Mood has been under good control w/ sertraline. Feels the dose is correct for her.    Health Care Directive  Patient does not have a Health Care Directive: Discussed advance care planning with patient; however, patient declined at this time.      6/28/2023   General Health   Feel stress (tense, anxious, or unable to sleep) Only a little            6/28/2023   Nutrition   Three or more servings of calcium each day? No   Diet: regular (no restrictions)            6/28/2023   Exercise   Days per week of moderate/strenous exercise 1 day   Average minutes spent exercising at this level 20 min   Frequency of exercise: None            6/28/2023   Social Factors   Frequency of gathering with friends " "or relatives More than three times a week            6/28/2023   Dental   Dentist two times every year? No               Today's PHQ-9 Score:       6/28/2023    10:35 AM   PHQ-9 SCORE   PHQ-9 Total Score MyChart 1 (Minimal depression)   PHQ-9 Total Score 1           6/28/2023   Substance Use   Frequency of drinking alcohol? 2-4 times a month   Alcohol more than 3/day or more than 7/wk No        Social History     Tobacco Use    Smoking status: Never     Passive exposure: Never    Smokeless tobacco: Never   Vaping Use    Vaping status: Never Used   Substance Use Topics    Alcohol use: Yes     Comment: 2-3 times per month, 2 per time    Drug use: No           6/28/2023   LAST FHS-7 RESULTS   1st degree relative breast or ovarian cancer No    Any relative bilateral breast cancer Unknown    Any male have breast cancer No    Any ONE woman have BOTH breast AND ovarian cancer No    Any woman with breast cancer before 50yrs No    2 or more relatives with breast AND/OR ovarian cancer No    2 or more relatives with breast AND/OR bowel cancer No        Patient-reported                 5/27/2022     1:40 PM 2/14/2017    12:00 AM   PAP / HPV   PAP Negative for Intraepithelial Lesion or Malignancy (NILM)     PAP-ABSTRACT  See Scanned Document           This result is from an external source.          Objective    Exam  /68 (BP Location: Right arm, Patient Position: Sitting, Cuff Size: Adult Large)   Pulse 109   Temp 98.6  F (37  C) (Temporal)   Resp 18   Ht 1.655 m (5' 5.15\")   Wt 80.1 kg (176 lb 9.6 oz)   LMP 10/30/2024 (Exact Date)   SpO2 98%   BMI 29.25 kg/m     Estimated body mass index is 29.25 kg/m  as calculated from the following:    Height as of this encounter: 1.655 m (5' 5.15\").    Weight as of this encounter: 80.1 kg (176 lb 9.6 oz).    Physical Exam  GENERAL: healthy, alert and no distress  EYES: PERRL, EOMI  HENT: ear canals and TM's normal. No nasal discharge. OP moist.  NECK: no adenopathy  RESP: " lungs clear to auscultation - no rales, rhonchi or wheezes  CV: regular rate and rhythm, normal S1 S2, no murmur, no peripheral edema  ABDOMEN: soft, nontender, bowel sounds normal  MS: no gross musculoskeletal defects noted  SKIN: no suspicious lesions or rashes  NEURO: Normal strength and tone  PSYCH: mentation appears normal, affect normal         Signed Electronically by: Tommy Collins MD